# Patient Record
Sex: MALE | Race: WHITE | NOT HISPANIC OR LATINO | URBAN - METROPOLITAN AREA
[De-identification: names, ages, dates, MRNs, and addresses within clinical notes are randomized per-mention and may not be internally consistent; named-entity substitution may affect disease eponyms.]

---

## 2017-01-03 ENCOUNTER — OFFICE VISIT - HEALTHEAST (OUTPATIENT)
Dept: FAMILY MEDICINE | Facility: CLINIC | Age: 42
End: 2017-01-03

## 2017-01-03 DIAGNOSIS — M54.10 RADICULAR PAIN OF RIGHT LOWER EXTREMITY: ICD-10-CM

## 2017-01-03 DIAGNOSIS — M54.50 LOWER BACK PAIN: ICD-10-CM

## 2017-01-10 ENCOUNTER — HOSPITAL ENCOUNTER (OUTPATIENT)
Dept: PHYSICAL MEDICINE AND REHAB | Facility: CLINIC | Age: 42
Discharge: HOME | End: 2017-01-10
Attending: FAMILY MEDICINE

## 2017-01-10 DIAGNOSIS — M48.061 FORAMINAL STENOSIS OF LUMBAR REGION: ICD-10-CM

## 2017-01-10 DIAGNOSIS — G89.29 CHRONIC MIDLINE LOW BACK PAIN WITHOUT SCIATICA: ICD-10-CM

## 2017-01-10 DIAGNOSIS — M51.369 DEGENERATIVE DISC DISEASE, LUMBAR: ICD-10-CM

## 2017-01-10 DIAGNOSIS — M54.50 CHRONIC MIDLINE LOW BACK PAIN WITHOUT SCIATICA: ICD-10-CM

## 2017-01-10 DIAGNOSIS — M47.816 LUMBAR FACET ARTHROPATHY: ICD-10-CM

## 2017-01-10 DIAGNOSIS — M54.50 RIGHT LOW BACK PAIN: ICD-10-CM

## 2017-01-10 ASSESSMENT — MIFFLIN-ST. JEOR: SCORE: 1890.97

## 2017-04-14 ENCOUNTER — OFFICE VISIT - HEALTHEAST (OUTPATIENT)
Dept: PHYSICAL THERAPY | Facility: CLINIC | Age: 42
End: 2017-04-14

## 2017-04-14 ENCOUNTER — AMBULATORY - HEALTHEAST (OUTPATIENT)
Dept: PHYSICAL MEDICINE AND REHAB | Facility: CLINIC | Age: 42
End: 2017-04-14

## 2017-04-14 DIAGNOSIS — G89.29 CHRONIC BILATERAL LOW BACK PAIN WITHOUT SCIATICA: ICD-10-CM

## 2017-04-14 DIAGNOSIS — M62.81 WEAKNESS OF TRUNK MUSCULATURE: ICD-10-CM

## 2017-04-14 DIAGNOSIS — M54.50 CHRONIC BILATERAL LOW BACK PAIN WITHOUT SCIATICA: ICD-10-CM

## 2017-04-17 ENCOUNTER — OFFICE VISIT - HEALTHEAST (OUTPATIENT)
Dept: PHYSICAL THERAPY | Facility: CLINIC | Age: 42
End: 2017-04-17

## 2017-04-17 DIAGNOSIS — M54.50 CHRONIC BILATERAL LOW BACK PAIN WITHOUT SCIATICA: ICD-10-CM

## 2017-04-17 DIAGNOSIS — G89.29 CHRONIC BILATERAL LOW BACK PAIN WITHOUT SCIATICA: ICD-10-CM

## 2017-04-17 DIAGNOSIS — M62.81 WEAKNESS OF TRUNK MUSCULATURE: ICD-10-CM

## 2017-04-19 ENCOUNTER — OFFICE VISIT - HEALTHEAST (OUTPATIENT)
Dept: PHYSICAL THERAPY | Facility: CLINIC | Age: 42
End: 2017-04-19

## 2017-04-19 DIAGNOSIS — M54.50 CHRONIC BILATERAL LOW BACK PAIN WITHOUT SCIATICA: ICD-10-CM

## 2017-04-19 DIAGNOSIS — M62.81 WEAKNESS OF TRUNK MUSCULATURE: ICD-10-CM

## 2017-04-19 DIAGNOSIS — G89.29 CHRONIC BILATERAL LOW BACK PAIN WITHOUT SCIATICA: ICD-10-CM

## 2017-04-24 ENCOUNTER — OFFICE VISIT - HEALTHEAST (OUTPATIENT)
Dept: PHYSICAL THERAPY | Facility: CLINIC | Age: 42
End: 2017-04-24

## 2017-04-24 DIAGNOSIS — G89.29 CHRONIC BILATERAL LOW BACK PAIN WITHOUT SCIATICA: ICD-10-CM

## 2017-04-24 DIAGNOSIS — M54.50 CHRONIC BILATERAL LOW BACK PAIN WITHOUT SCIATICA: ICD-10-CM

## 2017-04-24 DIAGNOSIS — M62.81 WEAKNESS OF TRUNK MUSCULATURE: ICD-10-CM

## 2017-04-28 ENCOUNTER — OFFICE VISIT - HEALTHEAST (OUTPATIENT)
Dept: PHYSICAL THERAPY | Facility: CLINIC | Age: 42
End: 2017-04-28

## 2017-04-28 DIAGNOSIS — M62.81 WEAKNESS OF TRUNK MUSCULATURE: ICD-10-CM

## 2017-04-28 DIAGNOSIS — M54.50 CHRONIC BILATERAL LOW BACK PAIN WITHOUT SCIATICA: ICD-10-CM

## 2017-04-28 DIAGNOSIS — G89.29 CHRONIC BILATERAL LOW BACK PAIN WITHOUT SCIATICA: ICD-10-CM

## 2017-05-01 ENCOUNTER — OFFICE VISIT - HEALTHEAST (OUTPATIENT)
Dept: PHYSICAL THERAPY | Facility: CLINIC | Age: 42
End: 2017-05-01

## 2017-05-01 DIAGNOSIS — G89.29 CHRONIC BILATERAL LOW BACK PAIN WITHOUT SCIATICA: ICD-10-CM

## 2017-05-01 DIAGNOSIS — M54.50 CHRONIC BILATERAL LOW BACK PAIN WITHOUT SCIATICA: ICD-10-CM

## 2017-05-01 DIAGNOSIS — M62.81 WEAKNESS OF TRUNK MUSCULATURE: ICD-10-CM

## 2017-05-03 ENCOUNTER — OFFICE VISIT - HEALTHEAST (OUTPATIENT)
Dept: PHYSICAL THERAPY | Facility: CLINIC | Age: 42
End: 2017-05-03

## 2017-05-03 DIAGNOSIS — M54.50 CHRONIC BILATERAL LOW BACK PAIN WITHOUT SCIATICA: ICD-10-CM

## 2017-05-03 DIAGNOSIS — M62.81 WEAKNESS OF TRUNK MUSCULATURE: ICD-10-CM

## 2017-05-03 DIAGNOSIS — G89.29 CHRONIC BILATERAL LOW BACK PAIN WITHOUT SCIATICA: ICD-10-CM

## 2017-05-08 ENCOUNTER — OFFICE VISIT - HEALTHEAST (OUTPATIENT)
Dept: PHYSICAL THERAPY | Facility: CLINIC | Age: 42
End: 2017-05-08

## 2017-05-08 DIAGNOSIS — M62.81 WEAKNESS OF TRUNK MUSCULATURE: ICD-10-CM

## 2017-05-08 DIAGNOSIS — G89.29 CHRONIC BILATERAL LOW BACK PAIN WITHOUT SCIATICA: ICD-10-CM

## 2017-05-08 DIAGNOSIS — M54.50 CHRONIC BILATERAL LOW BACK PAIN WITHOUT SCIATICA: ICD-10-CM

## 2017-05-12 ENCOUNTER — OFFICE VISIT - HEALTHEAST (OUTPATIENT)
Dept: PHYSICAL THERAPY | Facility: CLINIC | Age: 42
End: 2017-05-12

## 2017-05-12 DIAGNOSIS — M54.50 CHRONIC BILATERAL LOW BACK PAIN WITHOUT SCIATICA: ICD-10-CM

## 2017-05-12 DIAGNOSIS — M62.81 WEAKNESS OF TRUNK MUSCULATURE: ICD-10-CM

## 2017-05-12 DIAGNOSIS — G89.29 CHRONIC BILATERAL LOW BACK PAIN WITHOUT SCIATICA: ICD-10-CM

## 2017-05-17 ENCOUNTER — OFFICE VISIT - HEALTHEAST (OUTPATIENT)
Dept: PHYSICAL THERAPY | Facility: CLINIC | Age: 42
End: 2017-05-17

## 2017-05-17 DIAGNOSIS — M62.81 WEAKNESS OF TRUNK MUSCULATURE: ICD-10-CM

## 2017-05-17 DIAGNOSIS — M54.50 CHRONIC BILATERAL LOW BACK PAIN WITHOUT SCIATICA: ICD-10-CM

## 2017-05-17 DIAGNOSIS — G89.29 CHRONIC BILATERAL LOW BACK PAIN WITHOUT SCIATICA: ICD-10-CM

## 2017-05-24 ENCOUNTER — OFFICE VISIT - HEALTHEAST (OUTPATIENT)
Dept: PHYSICAL THERAPY | Facility: CLINIC | Age: 42
End: 2017-05-24

## 2017-05-24 DIAGNOSIS — M62.81 WEAKNESS OF TRUNK MUSCULATURE: ICD-10-CM

## 2017-05-24 DIAGNOSIS — M54.50 CHRONIC BILATERAL LOW BACK PAIN WITHOUT SCIATICA: ICD-10-CM

## 2017-05-24 DIAGNOSIS — G89.29 CHRONIC BILATERAL LOW BACK PAIN WITHOUT SCIATICA: ICD-10-CM

## 2017-05-31 ENCOUNTER — COMMUNICATION - HEALTHEAST (OUTPATIENT)
Dept: PHYSICAL THERAPY | Facility: CLINIC | Age: 42
End: 2017-05-31

## 2017-06-05 ENCOUNTER — OFFICE VISIT - HEALTHEAST (OUTPATIENT)
Dept: FAMILY MEDICINE | Facility: CLINIC | Age: 42
End: 2017-06-05

## 2017-06-05 DIAGNOSIS — R07.0 THROAT PAIN: ICD-10-CM

## 2017-06-05 DIAGNOSIS — J02.0 STREP THROAT: ICD-10-CM

## 2017-06-09 ENCOUNTER — AMBULATORY - HEALTHEAST (OUTPATIENT)
Dept: PHYSICAL MEDICINE AND REHAB | Facility: CLINIC | Age: 42
End: 2017-06-09

## 2017-06-14 ENCOUNTER — OFFICE VISIT - HEALTHEAST (OUTPATIENT)
Dept: PHYSICAL THERAPY | Facility: CLINIC | Age: 42
End: 2017-06-14

## 2017-06-14 DIAGNOSIS — M62.81 WEAKNESS OF TRUNK MUSCULATURE: ICD-10-CM

## 2017-06-14 DIAGNOSIS — G89.29 CHRONIC BILATERAL LOW BACK PAIN WITHOUT SCIATICA: ICD-10-CM

## 2017-06-14 DIAGNOSIS — M54.50 CHRONIC BILATERAL LOW BACK PAIN WITHOUT SCIATICA: ICD-10-CM

## 2017-06-20 ENCOUNTER — AMBULATORY - HEALTHEAST (OUTPATIENT)
Dept: PHYSICAL MEDICINE AND REHAB | Facility: CLINIC | Age: 42
End: 2017-06-20

## 2017-11-01 ENCOUNTER — OFFICE VISIT - HEALTHEAST (OUTPATIENT)
Dept: FAMILY MEDICINE | Facility: CLINIC | Age: 42
End: 2017-11-01

## 2017-11-01 DIAGNOSIS — M51.26 LUMBAR DISC HERNIATION: ICD-10-CM

## 2017-11-01 DIAGNOSIS — M48.061 LUMBAR FORAMINAL STENOSIS: ICD-10-CM

## 2017-11-01 DIAGNOSIS — Z92.241 S/P EPIDURAL STEROID INJECTION: ICD-10-CM

## 2017-12-03 ENCOUNTER — OFFICE VISIT - HEALTHEAST (OUTPATIENT)
Dept: FAMILY MEDICINE | Facility: CLINIC | Age: 42
End: 2017-12-03

## 2017-12-03 DIAGNOSIS — J20.9 ACUTE BRONCHITIS: ICD-10-CM

## 2017-12-12 ENCOUNTER — OFFICE VISIT - HEALTHEAST (OUTPATIENT)
Dept: FAMILY MEDICINE | Facility: CLINIC | Age: 42
End: 2017-12-12

## 2017-12-12 ENCOUNTER — RECORDS - HEALTHEAST (OUTPATIENT)
Dept: GENERAL RADIOLOGY | Facility: CLINIC | Age: 42
End: 2017-12-12

## 2017-12-12 DIAGNOSIS — J20.9 ACUTE BRONCHITIS, UNSPECIFIED ORGANISM: ICD-10-CM

## 2017-12-12 DIAGNOSIS — R05.9 COUGH: ICD-10-CM

## 2017-12-12 ASSESSMENT — MIFFLIN-ST. JEOR: SCORE: 1906.85

## 2017-12-18 ENCOUNTER — COMMUNICATION - HEALTHEAST (OUTPATIENT)
Dept: SCHEDULING | Facility: CLINIC | Age: 42
End: 2017-12-18

## 2017-12-19 ENCOUNTER — OFFICE VISIT - HEALTHEAST (OUTPATIENT)
Dept: FAMILY MEDICINE | Facility: CLINIC | Age: 42
End: 2017-12-19

## 2017-12-19 DIAGNOSIS — R55 SYNCOPAL EPISODES: ICD-10-CM

## 2017-12-19 DIAGNOSIS — R05.9 COUGH: ICD-10-CM

## 2019-12-04 ENCOUNTER — OFFICE VISIT - HEALTHEAST (OUTPATIENT)
Dept: FAMILY MEDICINE | Facility: CLINIC | Age: 44
End: 2019-12-04

## 2019-12-04 DIAGNOSIS — J32.0 MAXILLARY SINUSITIS, UNSPECIFIED CHRONICITY: ICD-10-CM

## 2019-12-04 ASSESSMENT — MIFFLIN-ST. JEOR: SCORE: 1845.15

## 2020-01-30 ENCOUNTER — OFFICE VISIT - HEALTHEAST (OUTPATIENT)
Dept: FAMILY MEDICINE | Facility: CLINIC | Age: 45
End: 2020-01-30

## 2020-01-30 DIAGNOSIS — B35.1 ONYCHOMYCOSIS: ICD-10-CM

## 2020-01-30 DIAGNOSIS — R06.81 APNEIC EPISODE: ICD-10-CM

## 2020-01-30 DIAGNOSIS — E66.811 CLASS 1 OBESITY DUE TO EXCESS CALORIES WITHOUT SERIOUS COMORBIDITY WITH BODY MASS INDEX (BMI) OF 31.0 TO 31.9 IN ADULT: ICD-10-CM

## 2020-01-30 DIAGNOSIS — R73.01 IMPAIRED FASTING GLUCOSE: ICD-10-CM

## 2020-01-30 DIAGNOSIS — Z51.81 ENCOUNTER FOR THERAPEUTIC DRUG MONITORING: ICD-10-CM

## 2020-01-30 DIAGNOSIS — E66.09 CLASS 1 OBESITY DUE TO EXCESS CALORIES WITHOUT SERIOUS COMORBIDITY WITH BODY MASS INDEX (BMI) OF 31.0 TO 31.9 IN ADULT: ICD-10-CM

## 2020-01-30 DIAGNOSIS — R06.83 SNORING: ICD-10-CM

## 2020-01-30 LAB
ALBUMIN SERPL-MCNC: 4.5 G/DL (ref 3.5–5)
ALP SERPL-CCNC: 51 U/L (ref 45–120)
ALT SERPL W P-5'-P-CCNC: 14 U/L (ref 0–45)
AST SERPL W P-5'-P-CCNC: 18 U/L (ref 0–40)
BILIRUB DIRECT SERPL-MCNC: 0.2 MG/DL
BILIRUB SERPL-MCNC: 0.8 MG/DL (ref 0–1)
HBA1C MFR BLD: 5.1 % (ref 3.5–6)
PROT SERPL-MCNC: 7.1 G/DL (ref 6–8)

## 2020-03-03 ENCOUNTER — COMMUNICATION - HEALTHEAST (OUTPATIENT)
Dept: LAB | Facility: CLINIC | Age: 45
End: 2020-03-03

## 2020-03-03 DIAGNOSIS — Z51.81 MEDICATION MONITORING ENCOUNTER: ICD-10-CM

## 2020-03-13 ENCOUNTER — AMBULATORY - HEALTHEAST (OUTPATIENT)
Dept: LAB | Facility: CLINIC | Age: 45
End: 2020-03-13

## 2020-03-13 DIAGNOSIS — Z51.81 MEDICATION MONITORING ENCOUNTER: ICD-10-CM

## 2020-03-13 LAB
ALBUMIN SERPL-MCNC: 4.4 G/DL (ref 3.5–5)
ALP SERPL-CCNC: 50 U/L (ref 45–120)
ALT SERPL W P-5'-P-CCNC: 15 U/L (ref 0–45)
AST SERPL W P-5'-P-CCNC: 19 U/L (ref 0–40)
BILIRUB DIRECT SERPL-MCNC: 0.2 MG/DL
BILIRUB SERPL-MCNC: 0.6 MG/DL (ref 0–1)
ERYTHROCYTE [DISTWIDTH] IN BLOOD BY AUTOMATED COUNT: 12 % (ref 11–14.5)
HCT VFR BLD AUTO: 48.5 % (ref 40–54)
HGB BLD-MCNC: 16.8 G/DL (ref 14–18)
MCH RBC QN AUTO: 31.3 PG (ref 27–34)
MCHC RBC AUTO-ENTMCNC: 34.7 G/DL (ref 32–36)
MCV RBC AUTO: 90 FL (ref 80–100)
PLATELET # BLD AUTO: 248 THOU/UL (ref 140–440)
PMV BLD AUTO: 6.5 FL (ref 7–10)
PROT SERPL-MCNC: 7.3 G/DL (ref 6–8)
RBC # BLD AUTO: 5.37 MILL/UL (ref 4.4–6.2)
WBC: 6.5 THOU/UL (ref 4–11)

## 2020-07-31 ENCOUNTER — OFFICE VISIT - HEALTHEAST (OUTPATIENT)
Dept: FAMILY MEDICINE | Facility: CLINIC | Age: 45
End: 2020-07-31

## 2020-07-31 DIAGNOSIS — R06.83 SNORING: ICD-10-CM

## 2020-07-31 DIAGNOSIS — Z11.4 ENCOUNTER FOR SCREENING FOR HIV: ICD-10-CM

## 2020-07-31 DIAGNOSIS — Z00.00 ROUTINE GENERAL MEDICAL EXAMINATION AT A HEALTH CARE FACILITY: ICD-10-CM

## 2020-07-31 DIAGNOSIS — K40.90 RIGHT INGUINAL HERNIA: ICD-10-CM

## 2020-07-31 DIAGNOSIS — E66.811 CLASS 1 OBESITY DUE TO EXCESS CALORIES WITHOUT SERIOUS COMORBIDITY WITH BODY MASS INDEX (BMI) OF 31.0 TO 31.9 IN ADULT: ICD-10-CM

## 2020-07-31 DIAGNOSIS — E66.09 CLASS 1 OBESITY DUE TO EXCESS CALORIES WITHOUT SERIOUS COMORBIDITY WITH BODY MASS INDEX (BMI) OF 31.0 TO 31.9 IN ADULT: ICD-10-CM

## 2020-07-31 DIAGNOSIS — H93.13 TINNITUS, BILATERAL: ICD-10-CM

## 2020-07-31 DIAGNOSIS — R73.01 IMPAIRED FASTING GLUCOSE: ICD-10-CM

## 2020-07-31 DIAGNOSIS — N52.9 ERECTILE DYSFUNCTION, UNSPECIFIED ERECTILE DYSFUNCTION TYPE: ICD-10-CM

## 2020-07-31 DIAGNOSIS — B35.1 ONYCHOMYCOSIS: ICD-10-CM

## 2020-07-31 LAB
ANION GAP SERPL CALCULATED.3IONS-SCNC: 9 MMOL/L (ref 5–18)
BUN SERPL-MCNC: 9 MG/DL (ref 8–22)
CALCIUM SERPL-MCNC: 9.3 MG/DL (ref 8.5–10.5)
CHLORIDE BLD-SCNC: 105 MMOL/L (ref 98–107)
CHOLEST SERPL-MCNC: 187 MG/DL
CO2 SERPL-SCNC: 27 MMOL/L (ref 22–31)
CREAT SERPL-MCNC: 1.04 MG/DL (ref 0.7–1.3)
FASTING STATUS PATIENT QL REPORTED: YES
GFR SERPL CREATININE-BSD FRML MDRD: >60 ML/MIN/1.73M2
GLUCOSE BLD-MCNC: 88 MG/DL (ref 70–125)
HDLC SERPL-MCNC: 42 MG/DL
HIV 1+2 AB+HIV1 P24 AG SERPL QL IA: NEGATIVE
LDLC SERPL CALC-MCNC: 120 MG/DL
POTASSIUM BLD-SCNC: 4.3 MMOL/L (ref 3.5–5)
SODIUM SERPL-SCNC: 141 MMOL/L (ref 136–145)
TRIGL SERPL-MCNC: 123 MG/DL

## 2020-07-31 RX ORDER — SILDENAFIL 50 MG/1
50 TABLET, FILM COATED ORAL DAILY PRN
Qty: 30 TABLET | Refills: 5 | Status: SHIPPED | OUTPATIENT
Start: 2020-07-31

## 2020-07-31 ASSESSMENT — MIFFLIN-ST. JEOR: SCORE: 1879.62

## 2020-08-03 ENCOUNTER — COMMUNICATION - HEALTHEAST (OUTPATIENT)
Dept: FAMILY MEDICINE | Facility: CLINIC | Age: 45
End: 2020-08-03

## 2020-08-20 ENCOUNTER — OFFICE VISIT - HEALTHEAST (OUTPATIENT)
Dept: OTOLARYNGOLOGY | Facility: CLINIC | Age: 45
End: 2020-08-20

## 2020-08-20 ENCOUNTER — OFFICE VISIT - HEALTHEAST (OUTPATIENT)
Dept: AUDIOLOGY | Facility: CLINIC | Age: 45
End: 2020-08-20

## 2020-08-20 DIAGNOSIS — H93.13 TINNITUS OF BOTH EARS: ICD-10-CM

## 2020-08-20 DIAGNOSIS — H90.3 SENSORINEURAL HEARING LOSS (SNHL) OF BOTH EARS: ICD-10-CM

## 2020-08-24 ENCOUNTER — COMMUNICATION - HEALTHEAST (OUTPATIENT)
Dept: SLEEP MEDICINE | Facility: CLINIC | Age: 45
End: 2020-08-24

## 2021-01-15 ENCOUNTER — OFFICE VISIT - HEALTHEAST (OUTPATIENT)
Dept: FAMILY MEDICINE | Facility: CLINIC | Age: 46
End: 2021-01-15

## 2021-01-15 ENCOUNTER — AMBULATORY - HEALTHEAST (OUTPATIENT)
Dept: OTHER | Facility: CLINIC | Age: 46
End: 2021-01-15

## 2021-01-15 DIAGNOSIS — M54.16 LUMBAR RADICULOPATHY: ICD-10-CM

## 2021-01-15 DIAGNOSIS — Z23 ENCOUNTER FOR IMMUNIZATION: ICD-10-CM

## 2021-01-15 DIAGNOSIS — M54.42 CHRONIC MIDLINE LOW BACK PAIN WITH LEFT-SIDED SCIATICA: ICD-10-CM

## 2021-01-15 DIAGNOSIS — G89.29 CHRONIC MIDLINE LOW BACK PAIN WITH LEFT-SIDED SCIATICA: ICD-10-CM

## 2021-01-15 RX ORDER — GABAPENTIN 300 MG/1
300 CAPSULE ORAL 3 TIMES DAILY
Qty: 90 CAPSULE | Refills: 2 | Status: SHIPPED | OUTPATIENT
Start: 2021-01-15

## 2021-01-15 RX ORDER — IBUPROFEN 200 MG
200 TABLET ORAL EVERY 6 HOURS PRN
Status: SHIPPED | COMMUNITY
Start: 2021-01-15

## 2021-01-15 RX ORDER — ACETAMINOPHEN 500 MG
500 TABLET ORAL EVERY 6 HOURS PRN
Status: SHIPPED | COMMUNITY
Start: 2021-01-15

## 2021-05-26 ENCOUNTER — RECORDS - HEALTHEAST (OUTPATIENT)
Dept: ADMINISTRATIVE | Facility: CLINIC | Age: 46
End: 2021-05-26

## 2021-05-28 ENCOUNTER — RECORDS - HEALTHEAST (OUTPATIENT)
Dept: ADMINISTRATIVE | Facility: CLINIC | Age: 46
End: 2021-05-28

## 2021-05-30 VITALS — WEIGHT: 225 LBS | BODY MASS INDEX: 33.33 KG/M2 | HEIGHT: 69 IN

## 2021-05-30 VITALS — WEIGHT: 229 LBS

## 2021-05-31 VITALS — BODY MASS INDEX: 34.36 KG/M2 | WEIGHT: 232.7 LBS

## 2021-05-31 VITALS — BODY MASS INDEX: 33.23 KG/M2 | WEIGHT: 225 LBS

## 2021-05-31 VITALS — WEIGHT: 228.5 LBS | HEIGHT: 69 IN | BODY MASS INDEX: 33.84 KG/M2

## 2021-05-31 VITALS — BODY MASS INDEX: 33.37 KG/M2 | WEIGHT: 226 LBS

## 2021-05-31 VITALS — BODY MASS INDEX: 32.64 KG/M2 | WEIGHT: 221 LBS

## 2021-06-02 ENCOUNTER — RECORDS - HEALTHEAST (OUTPATIENT)
Dept: ADMINISTRATIVE | Facility: CLINIC | Age: 46
End: 2021-06-02

## 2021-06-04 VITALS
DIASTOLIC BLOOD PRESSURE: 76 MMHG | HEART RATE: 61 BPM | BODY MASS INDEX: 31.51 KG/M2 | OXYGEN SATURATION: 97 % | SYSTOLIC BLOOD PRESSURE: 118 MMHG | HEIGHT: 70 IN | WEIGHT: 220.1 LBS

## 2021-06-04 VITALS
HEART RATE: 71 BPM | BODY MASS INDEX: 31.28 KG/M2 | SYSTOLIC BLOOD PRESSURE: 110 MMHG | OXYGEN SATURATION: 97 % | DIASTOLIC BLOOD PRESSURE: 76 MMHG | WEIGHT: 218 LBS

## 2021-06-04 VITALS
OXYGEN SATURATION: 98 % | SYSTOLIC BLOOD PRESSURE: 116 MMHG | HEART RATE: 72 BPM | RESPIRATION RATE: 16 BRPM | WEIGHT: 211.4 LBS | TEMPERATURE: 99.4 F | HEIGHT: 70 IN | DIASTOLIC BLOOD PRESSURE: 64 MMHG | BODY MASS INDEX: 30.26 KG/M2

## 2021-06-05 VITALS
DIASTOLIC BLOOD PRESSURE: 90 MMHG | OXYGEN SATURATION: 98 % | BODY MASS INDEX: 32.87 KG/M2 | TEMPERATURE: 98.9 F | WEIGHT: 227 LBS | SYSTOLIC BLOOD PRESSURE: 120 MMHG | HEART RATE: 75 BPM

## 2021-06-05 NOTE — PROGRESS NOTES
Assessment/Plan:    1. Onychomycosis  Onychomycosis bilateral feet involving great toe as well as fourth and fifth toes of both feet.  Terbinafine 250 mg daily x12 weeks.  Prior CBC normal.  LFTs today to establish baseline and then anticipate lab draw in 6 weeks to check a CBC and LFTs for med monitoring.  Reassess at physical exam in this office no later than 6 months.  - terbinafine HCl (LAMISIL) 250 mg tablet; Take 1 tablet (250 mg total) by mouth daily.  Dispense: 84 tablet; Refill: 0    2. Encounter for therapeutic drug monitoring  Med monitoring completed as noted.  - Hepatic Profile    3. Snoring  Snoring with history of witnessed apneic episodes.  Sleep study to be completed.  - Ambulatory referral to Sleep Medicine    4. Apneic episode  As above.  - Ambulatory referral to Sleep Medicine    5. Class 1 obesity due to excess calories without serious comorbidity with body mass index (BMI) of 31.0 to 31.9 in adult  Dietary and exercise modification for weight goal less than 210 pounds initially, less than 200 pounds ideally.    6. Impaired fasting glucose  Impaired fasting glucose history with prior fasting blood sugar 110 through [facility] healthcare.  A1c obtained today, nonfasting.  - Glycosylated Hemoglobin A1c      The following high BMI interventions were performed this visit: encouragement to exercise, weight monitoring, weight loss from baseline weight and lifestyle education regarding diet .  Ensure ongoing efforts to achieve weight goal < 210 pounds initially, < 200 pounds ideally.         Subjective:    Flaquito Murguia is seen today for concerns with toenail fungus.  Retired from the .  Bilateral foot involvement involving great toe plus fourth and fifth toes bilateral feet.  Ongoing issue.  Does snore.  Prior episodes of witnessed apnea described.  Obesity.  Had lost weight down to 195 pounds due to stress of going through separation and upcoming divorce however has regained weight.   "Impaired fasting glucose noted with blood sugar 110 at time of labs 2019.  Total cholesterol 161, triglycerides 76, HDL 54 and  at that time.  Past medical social and family history reviewed and updated as noted below.     - \"Hoda\" x    2 sons - Alexi born 10 and Ramirez ()   1 daughter - Johanna 98    - [employer]   Mom -  62 [employer], \"enlarged heart\" with h/o rheumatic fever   Dad -   1 older brother -   1 younger sister -   Surgeries: left hernia surgery age 1  HX of pneumonia as a child   Tobacco - smokeless tobacco 2 tin lasts 1 week   EtOH - 1 or 2 per week    History reviewed. No pertinent surgical history.     History reviewed. No pertinent family history.     History reviewed. No pertinent past medical history.     Social History     Tobacco Use     Smoking status: Never Smoker     Smokeless tobacco: Current User     Types: Chew   Substance Use Topics     Alcohol use: No     Drug use: No        Current Outpatient Medications   Medication Sig Dispense Refill     terbinafine HCl (LAMISIL) 250 mg tablet Take 1 tablet (250 mg total) by mouth daily. 84 tablet 0     No current facility-administered medications for this visit.           Objective:    Vitals:    20 1320   BP: 110/76   Pulse: 71   SpO2: 97%   Weight: 218 lb (98.9 kg)      Body mass index is 31.28 kg/m .    Alert.  No apparent distress.  Onychomycosis changes involving great toe as well as fourth and fifth toes bilateral feet.  No other skin rash.  Oropharynx with mildly narrowed oropharynx only otherwise neck supple.  Chest clear.  Cardiac exam regular.      This note has been dictated using voice recognition software and as a result may contain minor grammatical errors and unintended word substitutions.   "

## 2021-06-06 NOTE — TELEPHONE ENCOUNTER
Patient has a lab only appointment next week.  Looks like its labs for Terbinafine.  Please place orders.  Thank you!

## 2021-06-08 NOTE — PROGRESS NOTES
New patient evaluation  --C/O right low back pain, ongoing  --Rates pain 4/10  --No hx of back surgery  --PT in the past, nothing recent  --Hx of MRI lumbar spine and injection in 2011, place unknown    Medication  --Not taking any pain meds

## 2021-06-08 NOTE — PROGRESS NOTES
ASSESSMENT: Flaquito Murguia is a 41 y.o. male who presents for consultation at the request of HE PCP Dane Alfonso MD, with a past medical history significant for insomnia, headache who presents today for new patient evaluation of midline low back pain L4-5 and L5-S1 region with right-sided low back pain that is chronic and ongoing that typically worsens with  fitness testing that's done every 6 months.  Otherwise typically his pain is tolerable.    Prior MRI June 11, 2011 showing mild right-sided foraminal stenosis at L5-S1 as well as minimal annular bulge L3-L5 and mild right foraminal narrowing at L4-5 with facet joint hypertrophy L3-S1 bilateral.    He does have increased pain with facet loading indicating that the majority of his pain is likely facet mediated, however right-sided foraminal stenosis could be playing a role in his right-sided low back pain. He denies any radicular pain however, is neurologically intact on exam.  Continue conservative management at this time.    OFELIA: 14%    WHO-5: 22    PHQ-2: 0    Diagnoses and all orders for this visit:    Chronic midline low back pain without sciatica  -     Ambulatory referral to PT/OT    Right low back pain  -     Ambulatory referral to PT/OT    Lumbar facet arthropathy  -     Ambulatory referral to PT/OT    Foraminal stenosis of lumbar region  -     Ambulatory referral to PT/OT    Degenerative disc disease, lumbar  -     Ambulatory referral to PT/OT     PLAN:  Reviewed spine anatomy and disease process. Discussed diagnosis and treatment options with the patient today. A shared decision making model was used.  The patient's values and choices were respected. The following represents what was discussed and decided upon by the provider and the patient.      -DIAGNOSTIC TESTS:  Reviewed lumbar spine MRI from 2011.  As patient is neurologically intact at this time no need for further imaging however if pain worsens at any time we would recommend  updated lumbar MRI.    -PHYSICAL THERAPY:  Referral to physical therapy spine Center for lumbar MedX protocol.  Discussed the importance of core strengthening, ROM, stretching exercises with the patient and how each of these entities is important in decreasing pain.  Explained to the patient that the purpose of physical therapy is to teach the patient a home exercise program.  These exercises need to be performed every day in order to decrease pain and prevent future occurrences of pain.        -MEDICATIONS:  Advised patient to continue ibuprofen 800 mg 1 tablet 3 times a day as needed for pain.  No further medications prescribed today.    Discussed side effects of medications and proper use. Patient verbalized understanding.    -INTERVENTIONS:  Patient is not interested in injections at this time.    -PATIENT EDUCATION:  45 minutes of total visit time was spent face to face with the patient today, 60% of the visit was spent on counseling, education, and coordinating care.     -FOLLOW-UP:   Follow-up in 3 months, sooner if pain is worsening or new pain arises.    Advised pt to call the Spine Center if symptoms worsen or you have problems controlling bladder and bowel function.   ______________________________________________________________________    SUBJECTIVE:  HPI:  Flaquito Murguia  Is a 41 y.o. male who presents today for new patient evaluation of low back pain midline L4-5 and L5-S1 region with pain off to the right that is currently a 4/10 that is chronic in nature.  He reports that with fitness testing every 6 months for about 3 weeks his pain typically flares up at a 6-7/10 during that time.  He reports that sit-ups as well as running typically makes his pain more bothersome however other activities bending, lifting and pushups he tolerates.  He does report that sitting for too long or standing for too long makes his pain worse to however he is able to manage that without difficulty.  He denies any  radicular pain, denies numbness or tingling, denies weakness, denies bowel or bladder dysfunction, denies balance changes.    Treatment to Date: Physical therapy in 2011 with relief.  Lumbar spine MRI and epidural steroid injection 2011 with relief for 2 months however not interested in further injections at this time.    Medications: Ibuprofen 800 mg, 1 tablet 3 times a day which does give him some benefit.    Current Outpatient Prescriptions on File Prior to Encounter   Medication Sig Dispense Refill     [DISCONTINUED] ASPIRIN/SOD BICARB/CITRIC ACID (BARRY-SELTZER ORAL) Take 1 tablet by mouth daily as needed.       [DISCONTINUED] DM/P-EPHED/ACETAMINOPH/DOXYLAM (NYQUIL D ORAL) Take by mouth. As directed at bedtime       [DISCONTINUED] methylPREDNISolone (MEDROL DOSEPACK) 4 mg tablet Take 1 tablet (4 mg total) by mouth daily. follow package directions 21 tablet 0     [DISCONTINUED] pseudoephedrine-guaifenesin (MUCINEX D)  mg per tablet Take 1 tablet by mouth every 12 (twelve) hours.       No current facility-administered medications on file prior to encounter.        No Known Allergies    No past medical history on file.     Patient Active Problem List   Diagnosis     Insomnia     Headache     Wheezing (Symptom)       No past surgical history on file.    No family history on file.    SOCIAL HX: Patient is , does work in the .  Patient smokes on a daily basis, does drink 2-3 drinks per week, denies being a heavy drinker.    ROS: Positive for cough, back pain, joint pain.  Specifically negative for bowel/bladder dysfunction, balance changes, headache, dizziness, foot drop, fevers, chills, appetite changes, nausea/vomiting, unexplained weight loss. Otherwise 13 systems reviewed are negative. Please see the patient's intake questionnaire from today for details.    OBJECTIVE:  Visit Vitals     /69 (Patient Site: Left Arm, Patient Position: Sitting)     Pulse 67     Temp 98.7  F (37.1  C) (Oral)  "    Ht 5' 9\" (1.753 m)     Wt (!) 225 lb (102.1 kg)     SpO2 95%     BMI 33.23 kg/m2       PHYSICAL EXAMINATION:    --CONSTITUTIONAL:  Vital signs as above.  No acute distress.  The patient is well nourished and well groomed.  --PSYCHIATRIC:  Appropriate mood and affect. The patient is awake, alert, oriented to person, place, time and answering questions appropriately with clear speech.    --SKIN:  Skin over the face, bilateral lower extremities, and posterior torso is clean, dry, intact without rashes.    --RESPIRATORY: Normal rhythm and effort. No abnormal accessory muscle breathing patterns noted.   --ABDOMINAL:  Soft, non-distended, non-tender throughout all quadrants.  No pulsatile mass palpated in the left lower quadrant.  --STANDING EXAMINATION:  Normal lumbar lordosis noted, no lateral shift.  --MUSCULOSKELETAL: Lumbar spine inspection reveals no evidence of deformity. Range of motion is not limited in lumbar flexion, extension, however he does have increased pain with lumbar extension and lateral rotation simultaneously bilaterally.  Very minimal tenderness to palpation bilateral L4-5 and L5-S1 facet joint region. Straight leg raising in the supine position is negative to radicular pain, however positive to back pain on the right. Sciatic notch non-tender.  --GROSS MOTOR: Gait is non-antalgic. Easily arises from a seated position. Toe walking and heel walking are normal without significant difficulty.    --LOWER EXTREMITY MOTOR TESTING:  Plantar flexion left 5/5, right 5/5   Dorsiflexion left 5/5, right 5/5   Great toe MTP extension left 5/5, right 5/5  Knee flexion left 5/5, right 5/5  Knee extension left 5/5, right 5/5   Hip flexion left 5/5, right 5/5  Hip abduction left 5/5, right 5/5  Hip adduction left 5/5, right 5/5   --HIPS: Full range of motion bilaterally. Negative FABERs on the involved lower extremity.   --NEUROLOGICAL:  2/4 patellar, medial hamstring, and achilles reflexes bilaterally.  " Sensation to light touch is intact in the bilateral L4, L5, and S1 dermatomes. Babinski is negative. No clonus.  --VASCULAR:  2/4 dorsalis pedis and posterior tibialsi pulses bilaterally.  Bilateral lower extremities are warm.  There is no pitting edema of the bilateral lower extremities.    RESULTS: Prior medical records from 1/3/2016 were reviewed today.    Imaging: MRI of the lumbar spine was reviewed today. The images were shown to the patient and the findings were explained using a spine model.    Lumbar Spine MRI 6/7/2011  Conclusion:  1.  Mild bilateral foraminal stenosis L5-S1.  2.  Mild right foraminal stenosis L4-5.  3.  Minimal annular bulge at L3-L5 levels.  4.  Facet joint hypertrophy within the lower lumbar spine.  5.  Exam otherwise negative.

## 2021-06-08 NOTE — PROGRESS NOTES
"Subjective:    Flaquito Murguia is seen today for lower back pain.  Typically aggravated after completing physical fitness test for the [employer], employer.  Required every 6 months to complete sit ups and running activity.  Typically right side.  Has had issues dating back years.  Prior MRI 2011 showing mild right-sided foraminal stenosis at L5-S1 as well as minimal annular bulge L3-L5 and mild right foraminal narrowing at L4-5 with facet joint hypertrophy with an lower lumbar spine.  Had been seen to spine care in the past and received corticosteroid injection 2011.  Symptomatic exacerbation typically lasting about 3 weeks after fitness testing.  Wondering about possible note restricting activities to cycling or something else that doesn't cause back pain aggravation.     - \"Hoda\" x    2 sons - Alexi born 10 and Ramirez ()   1 daughter - Johanna 98   [employer]   Mom -  62 [employer], \"enlarged heart\" with h/o rheumatic fever   Dad -   1 older brother -   1 younger sister -   Surgeries:  left hernia surgery age 1  HX of pneumonia as a child   Tobacco - smokeless tobacco 2 tin lasts 1 week   EtOH - 1 or 2 per week    History reviewed. No pertinent past surgical history.     History reviewed. No pertinent family history.     History reviewed. No pertinent past medical history.     Social History   Substance Use Topics     Smoking status: Never Smoker     Smokeless tobacco: Current User     Types: Chew     Alcohol use No        Current Outpatient Prescriptions   Medication Sig Dispense Refill     ASPIRIN/SOD BICARB/CITRIC ACID (BARRY-SELTZER ORAL) Take 1 tablet by mouth daily as needed.       DM/P-EPHED/ACETAMINOPH/DOXYLAM (NYQUIL D ORAL) Take by mouth. As directed at bedtime       methylPREDNISolone (MEDROL DOSEPACK) 4 mg tablet Take 1 tablet (4 mg total) by mouth daily. follow package directions 21 tablet 0     pseudoephedrine-guaifenesin (MUCINEX D)  mg per " tablet Take 1 tablet by mouth every 12 (twelve) hours.       No current facility-administered medications for this visit.           Objective:    Vitals:    01/03/17 0953   BP: 118/80   Pulse: 72   Weight: (!) 229 lb (103.9 kg)      There is no height or weight on file to calculate BMI.    Alert.  No apparent distress.  Cooperative.  Overweight.  Transfers independently however somewhat slowly.  Diminished right patellar DTR more so than right Achilles DTR.  Otherwise normal left lower extremity DTRs noted on exam.  Right lower back discomfort, mild without significant muscle spasm.  No rash.      Assessment:    1. Lower back pain  Ambulatory referral to Spine Care    methylPREDNISolone (MEDROL DOSEPACK) 4 mg tablet   2. Radicular pain of right lower extremity  Ambulatory referral to Spine Care         Plan:    Discussed recurrent lower back pain with prior abnormal MRI June 11, 2011 reviewed as noted above.  Referred patient to healthy spine care for reevaluation and treatment options otherwise seemed to be associated typically with fitness testing activities through work and would recommend restrictions in order to avoid exacerbation.  Ibuprofen 800 mg 3 times daily ×3 days then as needed.  Medrol Dosepak was provided for current exacerbation and right lower extremity radiculopathy.  We'll notify with worsening or nonimprovement.

## 2021-06-10 NOTE — PROGRESS NOTES
[facility] Daily Progress     Patient Name: Flaquito Murguia  Date: 5/12/2017  Visit #: 8  PTA visit #:  -  Referral Diagnosis:   MEDX  Chronic midline low back pain without sciatica [M54.5, G89.29]  - Primary       Right low back pain [M54.5]       Lumbar facet arthropathy [M12.88]       Foraminal stenosis of lumbar region [M99.83]       Degenerative disc disease, lumbar [M51.36]       Referring provider: Kylah Garcia C*  Visit Diagnosis:     ICD-10-CM    1. Chronic bilateral low back pain without sciatica M54.5     G89.29    2. Weakness of trunk musculature M62.81          Assessment:     HEP/POC compliance is  good .     The patient was re-tested today and showed some improvements in ROM, but no great gains in strength. HE is tolerating higher levels of exercise weight, and likely is not as strong to do isometric pushing/holds due to pain. He is reporting overall improved pain and function and will benefit from continued PT.     He will continue to benefit from skilled PT to improve strength, endurance, mobility, pain, and function.    Goal Status:  Pt. will be independent with home exercise program in : 4 weeks  Pt. will report decreased intensity, frequency of : Pain;in 6 weeks;Comment  Comment:: 50%  Patient will decrease : OFELIA score;by _ points;for improved quality of function;in 6 weeks  by ___ points: 30%  Pt will: tolerate exercises required for  testing without flare of pain in 6 weeks:  Pt will: be able to participate in recreation, baskteball/volleyball without increase in pain in 6 weeks:    Plan / Patient Education:     Continue with initial plan of care.    Plan for next visit: DE on MEDX, rotary torso, review HEP, progress core strengthening, MT PRN for tight/tender lumbar musculature and possible joint mobilizations. Eventual assessment of physical testing exercises.  MEDX testing when pain flare resolves.    Subjective:     Pain Rating: 3/10    He has improved from  the softball injury. Yesterday and the day before was actually feeling pretty good. 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     Objective:     SLR 5/12/17  75/75    Lumbar ROM WNL and pain free    Exercises:  Exercise #1: Slump Sliders  Comment #1: X 10  Exercise #2: SLR Sliders  Comment #2: X 10  Exercise #3: KEYANNA  Comment #3: X 10   Exercise #4: Rotary Torso 90 seconds 58#'s  Comment #4: Started to R  Exercise #5: LTR  Comment #5: X 10   Exercise #6: Deadbug March  Comment #6: X 10  Exercise #7: Quadruped Leg Extension  Comment #7: X 10  Exercise #8: Planks  Comment #8: 3 X 20 seconds  Exercise #9: Kenyatta Pose  Comment #9: X 30 seconds  Exercise #10: Deadbug  Comment #10: 2 X 5  Exercise #11: Femoral Nerve Sliders  Comment #11: X 10    Treatment Today     TREATMENT MINUTES COMMENTS   Evaluation     Self-care/ Home management     Manual therapy 5 Prone SI/lumbar distraction 4 X 45 seconds   Neuromuscular Re-education     Therapeutic Activity     Therapeutic Exercises 25 MEDX, rotary torso, and review HEP   Gait training     Modality__________________                Total 30    Blank areas are intentional and mean the treatment did not include these items.       Jhony MAST  5/12/2017

## 2021-06-10 NOTE — PROGRESS NOTES
[facility]   [facility]-Pelvic Initial Evaluation    Patient Name: Flaquito Murguia  Date of evaluation: 4/14/2017  Referral Diagnosis: MEDX  Chronic midline low back pain without sciatica [M54.5, G89.29]  - Primary       Right low back pain [M54.5]       Lumbar facet arthropathy [M12.88]       Foraminal stenosis of lumbar region [M99.83]       Degenerative disc disease, lumbar [M51.36]       Referring provider: Kylah Garcia C*  Visit Diagnosis:     ICD-10-CM    1. Chronic bilateral low back pain without sciatica M54.5     G89.29    2. Weakness of trunk musculature M62.81        Assessment:        Flaquito Murguia is a 41 y.o. male who presents to therapy today with chief complaints of chronic bilateral midline/bilateral low back pain L>R which started in 2004 and has episodic flares. He is limited with prolonged positioning, physical activity, and physical testing for the . With examination the patient demonstrates painful lumbar and hip ROM, + LE neural tension testing, normal myotomal/dermatomal testing, with exception of possible weakness of PF on L, tenderness to palpation of lumbar paraspinals and QL, and weakness in the back per MEDX testing. The pt will likely benefit from skilled PT to improve ROM, strength, pain, and overall function.     Goals:  Pt. will be independent with home exercise program in : 4 weeks  Pt. will report decreased intensity, frequency of : Pain;in 6 weeks;Comment  Comment:: 50%  Patient will decrease : OFELIA score;by _ points;for improved quality of function;in 6 weeks  by ___ points: 30%  Pt will: tolerate exercises required for  testing without flare of pain in 6 weeks:  Pt will: be able to participate in recreation, baskteball/volleyball without increase in pain in 6 weeks:    Patient's expectations/goals are realistic.    Barriers to Learning or Achieving Goals:  Chronicity of the problem.       Plan / Patient Instructions:        Plan of Care:    Authorization / Certification Number of Visits: 12  Communication with: Referral Source  Patient Related Instruction: Nature of Condition;Self Care instruction;Treatment plan and rationale;Posture;Body mechanics;Precautions;Next steps;Expected outcome;Basis of treatment  Times per Week: 1-2  Number of Weeks: 12  Number of Visits: 12   Discharge Planning: pt will meet all PT goals or reach a plateau with PT  Precautions / Restrictions : none  Therapeutic Exercise: ROM;Stretching;Strengthening  Neuromuscular Reeducation: kinesio tape;posture;TNE;core  Manual Therapy: soft tissue mobilization;myofascial release;joint mobilization;muscle energy  Modalities: TENS;ultrasound;cold pack;hot pack;other  Modalities: PRN    Plan for next visit: DE on MEDX, rotary torso, review HEP, initiate core strengthening, MT PRN for tight/tender lumbar musculature and possible joint mobilizations. Eventual assessment of physical testing exercises.      Subjective:         Social information:   Occupation:Admin/HR    Work Status:Working full time    History of Present Illness:      Pain started about  with a basketball injury when he moved the wrong way and was unable to walk. Sit ups/push ups over the years as well as work in the  would increase his pain. The last couple of PT tests gives him a lot of pain. This will last for 2-3 weeks. Just started a chiropractor with electrotherapy.  Pain described as constant dull pain 3-4/10. The bottoms of his feet are tingling both sides.   Worse with get in and out car, stairs, sitting too long, standing too long, playing volleyball, grooming/dressing, PT testing.  Better with nothing specific.    Previous treatment PT, chiropractic, cortisone injections    Pain Ratin  Pain rating at best: 3  Pain rating at worst: 9  Pain description: aching, dull, pain and sharp    Functional limitations are described as occurring with:   Getting in and out of bed/chair/car, stairs,  sit-ups, running, dressing, prolonged sitting/standing/walking.       Objective:      Note: Items left blank indicates the item was not performed or not indicated at the time of the evaluation.    Patient Outcome Measures :    Modified Oswestry Low Back Pain Disablity Questionnaire  in %: 34   Scores range from 0-100%, where a score of 0% represents minimal pain and maximal function. The minimal clinically important difference is a score reduction of 12%.    Examination  1. Chronic bilateral low back pain without sciatica     2. Weakness of trunk musculature       Precautions/Restrictions: None  Involved side: Bilateral  Posture Observation:      General sitting posture is  normal.  General standing posture is normal.    Lumbar ROM:    Date: 4/14/17     *Indicate scale AROM AROM AROM   Lumbar Flexion To toes, pain in back     Lumbar Extension Some pain       Right Left Right Left Right Left   Lumbar Sidebending Pain on R WNL       Lumbar Rotation         Thoracic Rotation           Lower Extremity Strength:     Date: 4/14/17     LE strength/5 Right Left Right Left Right Left   Hip Flexion (L1-3) 5 5       Hip Extension (L5-S1) 5 5       Hip Abduction (L4-5) 5 5       Hip Adduction (L2-3)         Hip External Rotation         Hip Internal Rotation         Knee Extension (L3-4) 5 5       Knee Flexion 5 5       Ankle Dorsiflexion (L4-5) 5 5       Great Toe Extension (L5) 5 5       Ankle Plantar flexion (S1) 5 4       Abdominals        Sensation    WNL to lt touch sensation screen   Reflex Testing  Lumbar Dermatomes Right Left UE Reflexes Right Left   Iliac Crest and Groin (L1)   Biceps (C5-6)     Anterior Medial Thigh (L2)   Brachioradialis (C5-6)     Anterior Thigh, Medial Epicondyle Femur (L3)   Triceps (C7-8)     Lateral Thigh, Anterior Knee, Medial Leg/Malleolus (L4)   Lucretia s test     Lateral Leg, Dorsal Foot (L5)   LE Reflexes     Lateral Foot (S1)   Patellar (L3-4)     Posterior Leg (S2)   Achilles (S1-2)      Other:   Babinski Response       Palpation: Tenderness to palpation of lower lumbar paraspinals and QL bilaterally, PSIS tender bilaterally.    Lumbar Special Tests:     Lumbar Special Tests Right Left SI Tests Right  Left   Quadrant test   SI Compression     Straight leg raise 80 + 70 + SI Distraction     Crossover response - - POSH Test - -   Slump + + Sacral Thrust - -   Sit-up test  FADIR + +   Trunk extensor endurance test  Resisted Abduction - -   Prone instability test  SHRADDHA + +   Pubic shotgun  Other:       Repeated Motion Testing:  Does not centralize  Does not peripheralize    Passive Mobility - Joint Integrity:  Tender to L3,4,5 PA mobilizaitons, WNL mobility    LE Screen:  Some pain in back with hip ROM extremes.    Treatment Today     TREATMENT MINUTES COMMENTS   Evaluation 20 Low Complexity   Self-care/ Home management     Manual therapy     Neuromuscular Re-education 10 Education on nerve tension and neuromobilizations for HEP   Therapeutic Activity     Therapeutic Exercises 15 Education on POC, MEDX, and HEP   Gait training     Modality__________________                Total 45    Blank areas are intentional and mean the treatment did not include these items.     PT Evaluation Code: (Please list factors)  Patient History/Comorbidities: none  Examination: see objective  Clinical Presentation: stable  Clinical Decision Making: low    Patient History/  Comorbidities Examination  (body structures and functions, activity limitations, and/or participation restrictions) Clinical Presentation Clinical Decision Making (Complexity)   No documented Comorbidities or personal factors 1-2 Elements Stable and/or uncomplicated Low   1-2 documented comorbidities or personal factor 3 Elements Evolving clinical presentation with changing characteristics Moderate   3-4 documented comorbidities or personal factors 4 or more Unstable and unpredictable High                Jhony MAST  4/14/2017  2:01  PM

## 2021-06-10 NOTE — PROGRESS NOTES
Assessment/Plan:     1. Routine general medical examination at a health care facility  Routine healthcare maintenance.  Preventative cares reviewed.  Patient states up-to-date with immunizations through the  and will get copy of prior tetanus status etc.  Annual physical exams to continue.    2. Class 1 obesity due to excess calories without serious comorbidity with body mass index (BMI) of 31.0 to 31.9 in adult  Dietary and exercise modifications reviewed for weight goal less than 210 pounds initially, less than 200 pounds ideally.  The cascade to be obtained today as well as fasting glucose.  - Lipid Cascade    3. Onychomycosis  Onychomycosis history.  Improvement noted in fourth and fifth toenail changes bilateral feet as well as great toe nail involvement.  Did complete 12 weeks of terbinafine earlier this year with normal follow-up lab testing.    4. Snoring  Snoring history.  Had been referred to sleep medicine.  Virtual visit was set up however was never contacted by provider.  Will re-refer patient with history of apneic episode.  - Ambulatory referral to Sleep Medicine    5. Impaired fasting glucose  Prior fasting glucose 110 historically per patient November 22, 2019 with subsequent A1c of 5.1% January 30, 2020.  Check fasting glucose today with therapeutic lifestyle changes reviewed as noted above.  - Basic Metabolic Panel    6. Tinnitus, bilateral  Bilateral tinnitus left greater than right.  ENT referral.  Normal external auditory canal and tympanic membrane without middle ear effusion etc.  - Basic Metabolic Panel  - Ambulatory referral to ENT    7. Right inguinal hernia  Small right inguinal hernia, reducible asymptomatic.  Will monitor currently and consider further referral in future if symptomatic issues arise.    8. Erectile dysfunction, unspecified erectile dysfunction type  Sildenafil 50 mg use half tablet to 1 tablet daily as needed.  Prescription printed for good Rx filled through  "[facility] pharmacy at $17 for 30 tablets.  - sildenafiL (VIAGRA) 50 MG tablet; Take 1 tablet (50 mg total) by mouth daily as needed for erectile dysfunction.  Dispense: 30 tablet; Refill: 5    9. Encounter for screening for HIV  Routine HIV screen completed.  - HIV Antigen/Antibody Screening Cascade       The following high BMI interventions were performed this visit: encouragement to exercise, weight monitoring, weight loss from baseline weight and lifestyle education regarding diet.  Ensure ongoing efforts to achieve weight goal < 210 pounds initially, < 200 pounds ideally.              Subjective:      Flaquito Murguia is a 45 y.o. male who presents for an annual exam.  Concerns with \"performance\".  Difficulty to achieve adequate erection or maintain adequate erection.  Is sexually active currently and new relationship.  Snoring issues.  Apneic episode previously.  Previously referred to sleep medicine however was never contacted by provider at time of appointment.  Would like to complete referral still.  Ear ringing left more so than right without specific noise injury.  No ear drainage.  No ear pain.  Occasionally feels sensation where he loses hearing suddenly predominantly left ear than this resolves.  Prior history of impaired fasting glucose.  Was treated for onychomycosis with terbinafine 250 mg daily x12 weeks and tolerated fine.  Not aware of any inguinal hernia etc.  Denies recent illness.  No cough fever shortness of breath.  Comprehensive review of systems as above otherwise all negative.     (going through divorce) - \"Hoda\" x    2 sons - Alexi born 10 and Ramirez ()   1 daughter - Johanna 98    - [employer] (retiring 19 after > 28 years of service)  Mom -  62 [employer], \"enlarged heart\" with h/o rheumatic fever   Dad -   1 older brother -   1 younger sister -   Surgeries: left hernia surgery age 1  HX of pneumonia as a child   Tobacco - smokeless " tobacco 2 tin lasts 1 week   EtOH - 1 or 2 per week    Healthy Habits:   Regular Exercise: Yes  Healthy Diet: Yes  Dental Visits Regularly: Yes  Seat Belt: Yes   Sexually active: Yes  Colonoscopy: N/A  Lipid Profile: Yes  Glucose Screen: Yes    Immunization History   Administered Date(s) Administered     DT (pediatric) 01/01/2005     Td,adult,historic,unspecified 01/01/2005     Immunization status: stated as current, but no records available.  Vision Screening:both eyes and glasses  Hearing: FAIL (left > right tinnitus)     Current Outpatient Medications   Medication Sig Dispense Refill     sildenafiL (VIAGRA) 50 MG tablet Take 1 tablet (50 mg total) by mouth daily as needed for erectile dysfunction. 30 tablet 5     No current facility-administered medications for this visit.      History reviewed. No pertinent past medical history.  History reviewed. No pertinent surgical history.  Patient has no known allergies.  History reviewed. No pertinent family history.  Social History     Socioeconomic History     Marital status:      Spouse name: Not on file     Number of children: Not on file     Years of education: Not on file     Highest education level: Not on file   Occupational History     Not on file   Social Needs     Financial resource strain: Not on file     Food insecurity     Worry: Not on file     Inability: Not on file     Transportation needs     Medical: Not on file     Non-medical: Not on file   Tobacco Use     Smoking status: Never Smoker     Smokeless tobacco: Current User     Types: Chew   Substance and Sexual Activity     Alcohol use: No     Drug use: No     Sexual activity: Not on file   Lifestyle     Physical activity     Days per week: Not on file     Minutes per session: Not on file     Stress: Not on file   Relationships     Social connections     Talks on phone: Not on file     Gets together: Not on file     Attends [employer] service: Not on file     Active member of club or organization:  "Not on file     Attends meetings of clubs or organizations: Not on file     Relationship status: Not on file     Intimate partner violence     Fear of current or ex partner: Not on file     Emotionally abused: Not on file     Physically abused: Not on file     Forced sexual activity: Not on file   Other Topics Concern     Not on file   Social History Narrative     Not on file       Review of Systems  Comprehensive ROS: as above, otherwise all negative.           Objective:     /76 (Patient Site: Left Arm, Patient Position: Sitting, Cuff Size: Adult Large)   Pulse 61   Ht 5' 9.69\" (1.77 m)   Wt 220 lb 1.6 oz (99.8 kg)   SpO2 97%   BMI 31.87 kg/m    Body mass index is 31.87 kg/m .    Physical    General Appearance:    Alert, cooperative, no distress, appears stated age.  Obesity.   Head:    Normocephalic, without obvious abnormality, atraumatic   Eyes:    PERRL, conjunctiva/corneas clear, EOM's intact, fundi     benign, both eyes.  Glasses.        Ears:    Normal TM's and external ear canals, both ears   Nose:   Nares normal, septum midline, mucosa normal, no drainage    or sinus tenderness   Throat:   Lips, mucosa, and tongue normal; teeth and gums normal   Neck:   Supple, symmetrical, trachea midline, no adenopathy;        thyroid:  No enlargement/tenderness/nodules; no carotid    bruit or JVD   Back:     Symmetric, no curvature, ROM normal, no CVA tenderness   Lungs:     Clear to auscultation bilaterally, respirations unlabored   Chest wall:    No tenderness or deformity   Heart:    Regular rate and rhythm, S1 and S2 normal, no murmur, rub   or gallop   Abdomen:     Soft, non-tender, bowel sounds active all four quadrants,     no masses, no organomegaly.     Genitalia:    Normal male without lesion, discharge or tenderness.  Small right inguinal hernia noted.  Reducible.   Rectal:    deferred   Extremities:   Extremities normal, atraumatic, no cyanosis or edema   Pulses:   2+ and symmetric all " extremities   Skin:   Skin color, texture, turgor normal, no rashes or lesions   Lymph nodes:   Cervical, supraclavicular, and axillary nodes normal   Neurologic:   CNII-XII intact. Normal strength, sensation and reflexes       throughout                This note has been dictated using voice recognition software and as a result may contain minor grammatical errors and unintended word substitutions.

## 2021-06-10 NOTE — PROGRESS NOTES
[facility] Daily Progress     Patient Name: Flaquito Murguia  Date: 4/24/2017  Visit #: 4  PTA visit #:  -  Referral Diagnosis:   MEDX  Chronic midline low back pain without sciatica [M54.5, G89.29]  - Primary       Right low back pain [M54.5]       Lumbar facet arthropathy [M12.88]       Foraminal stenosis of lumbar region [M99.83]       Degenerative disc disease, lumbar [M51.36]       Referring provider: Kylah Garcia C*  Visit Diagnosis:     ICD-10-CM    1. Chronic bilateral low back pain without sciatica M54.5     G89.29    2. Weakness of trunk musculature M62.81          Assessment:     HEP/POC compliance is  good .     Patient with good tolerance to MEDX, which is improved with use of 1 pad for seat height. He also shows improved mobility with KEYANNA following PA mobilizations, which is consistent with pain that seems to be facet mediated. Also limitations in LTR to the R which is also consistent with rotary torso difficulty. This did improve after SL lumbar rotational mobilizations. He does show significant fatigue with core endurance with plank testing today, holding about 15-20 seconds prior to fatigue. He will continue to benefit from skilled PT to improve strength, endurance, mobility, pain, and function.    Goal Status:  Pt. will be independent with home exercise program in : 4 weeks  Pt. will report decreased intensity, frequency of : Pain;in 6 weeks;Comment  Comment:: 50%  Patient will decrease : OFELIA score;by _ points;for improved quality of function;in 6 weeks  by ___ points: 30%  Pt will: tolerate exercises required for  testing without flare of pain in 6 weeks:  Pt will: be able to participate in recreation, baskteball/volleyball without increase in pain in 6 weeks:    Plan / Patient Education:     Continue with initial plan of care.    Plan for next visit: DE on MEDX, rotary torso, review HEP, initiate core strengthening, MT PRN for tight/tender lumbar musculature and  possible joint mobilizations. Eventual assessment of physical testing exercises.     Subjective:     Pain Ratin-5/10    Right hip is sore. More sore today with not doing the exercises while he was at a conference. Did a lot of sitting. Feels tender/sore in the right hip. Has done his stretches 2X today.     Objective:     Difficulty with trunk rotation with knees to the right, also with rotary torso when going from right to left.  Improve with LTR after SL lumbar mobilizations.  Minimal to no tenderness to PA's today with improve mobility  + Trigger point to R Glut Med.    Treatment Today     TREATMENT MINUTES COMMENTS   Evaluation     Self-care/ Home management     Manual therapy 10 R SL lumbar rotational mobilizations grade III, PA mobilizations grade III to L3,4,5   Neuromuscular Re-education     Therapeutic Activity     Therapeutic Exercises 18 MEDX, rotary torso, and review HEP   Gait training     Modality__________________                Total 28    Blank areas are intentional and mean the treatment did not include these items.       Jhony MAST  2017

## 2021-06-10 NOTE — PROGRESS NOTES
[facility] Daily Progress     Patient Name: Flaquito Murguia  Date: 5/1/2017  Visit #: 6  PTA visit #:  -  Referral Diagnosis:   MEDX  Chronic midline low back pain without sciatica [M54.5, G89.29]  - Primary       Right low back pain [M54.5]       Lumbar facet arthropathy [M12.88]       Foraminal stenosis of lumbar region [M99.83]       Degenerative disc disease, lumbar [M51.36]       Referring provider: Kylah Garcia C*  Visit Diagnosis:     ICD-10-CM    1. Chronic bilateral low back pain without sciatica M54.5     G89.29    2. Weakness of trunk musculature M62.81          Assessment:     HEP/POC compliance is  good .     Patient with good tolerance to MEDX, which is improved with use of 1 pad for seat height. He also shows improved mobility with KEYANNA following PA mobilizations, which is consistent with pain that seems to be facet mediated. Also limitations in LTR to the R which is also consistent with rotary torso difficulty. He does show good pain relief with BLE distraction.. He does show significant fatigue with core endurance with plank testing, holding about 15-20 seconds prior to fatigue. He will continue to benefit from skilled PT to improve strength, endurance, mobility, pain, and function.    Goal Status:  Pt. will be independent with home exercise program in : 4 weeks  Pt. will report decreased intensity, frequency of : Pain;in 6 weeks;Comment  Comment:: 50%  Patient will decrease : OFELIA score;by _ points;for improved quality of function;in 6 weeks  by ___ points: 30%  Pt will: tolerate exercises required for  testing without flare of pain in 6 weeks:  Pt will: be able to participate in recreation, baskteball/volleyball without increase in pain in 6 weeks:    Plan / Patient Education:     Continue with initial plan of care.    Plan for next visit: DE on MEDX, rotary torso, review HEP, initiate core strengthening, MT PRN for tight/tender lumbar musculature and possible joint  mobilizations. Eventual assessment of physical testing exercises.     Subjective:     Pain Ratin/10    Feels like it is getting worse. Having trouble trying to find a comfortable in sitting or laying down. Walking is the most comfortable. Tingling in the bottom of both feet. The exercises help him to feel looser. He has not changed anything that he does functionally, does not feel limited and was able to do some yardwork Saturday.     Objective:     Decreased pain with BLE distraction.  Opening facets on R decreases pain.  Discussed laying with legs up on chair for 20 minutes to decrease pressure through lumbar spine. Pain significantly reduces to 3/10 in this position.  Decreased hip ROM on the RLE due to pain in hip.  Stopped repetitions at 20 on MEDX to avoid overworking.    Treatment Today     TREATMENT MINUTES COMMENTS   Evaluation     Self-care/ Home management     Manual therapy 5 BLE distraction.    Neuromuscular Re-education     Therapeutic Activity     Therapeutic Exercises 23 MEDX, rotary torso, and review HEP   Gait training     Modality__________________                Total 28    Blank areas are intentional and mean the treatment did not include these items.       Jhony MAST  2017

## 2021-06-10 NOTE — PROGRESS NOTES
[facility] Daily Progress     Patient Name: Flaquito Murguia  Date: 5/24/2017  Visit #: 10  PTA visit #:  -  Referral Diagnosis:   MEDX  Chronic midline low back pain without sciatica [M54.5, G89.29]  - Primary       Right low back pain [M54.5]       Lumbar facet arthropathy [M12.88]       Foraminal stenosis of lumbar region [M99.83]       Degenerative disc disease, lumbar [M51.36]       Referring provider: Kylah Garcia C*  Visit Diagnosis:     ICD-10-CM    1. Chronic bilateral low back pain without sciatica M54.5     G89.29    2. Weakness of trunk musculature M62.81          Assessment:     HEP/POC compliance is  good .     The patient was re-tested and showed some improvements in ROM, but no great gains in strength. He is tolerating higher levels of exercise weight, and likely is not as strong to do isometric pushing/holds due to pain. His hip flexor tightness may be related to pain and anterior leg pain. His pain seems to be a related to L3,4 facets with extension at these levels. He is reporting overall improved pain and function and will benefit from continued PT.    Goal Status:  Pt. will be independent with home exercise program in : 4 weeks  Pt. will report decreased intensity, frequency of : Pain;in 6 weeks;Comment  Comment:: 50%  Patient will decrease : OFELIA score;by _ points;for improved quality of function;in 6 weeks  by ___ points: 30%  Pt will: tolerate exercises required for  testing without flare of pain in 6 weeks:  Pt will: be able to participate in recreation, baskteball/volleyball without increase in pain in 6 weeks:    Plan / Patient Education:     Continue with initial plan of care.    Plan for next visit: DE on MEDX, rotary torso, review HEP, progress core strengthening, MT PRN for tight/tender lumbar musculature and possible joint mobilizations. Eventual assessment of physical testing exercises.      Progress core strengthening for HEP PRN.    Subjective:      Pain Rating: 3/10    Hip is good, feet and legs still bother him. Back is mild to moderate right now.    Objective:   Lumbar flexion to the toes.     Hip flexor tightness with direct source of pain with hip extension in side lying.    SLR 5/12/17  75/75    Exercises:  Exercise #1: Slump Sliders  Comment #1: X 10  Exercise #2: SLR Sliders  Comment #2: X 10  Exercise #3: KEYANNA  Comment #3: X 10   Exercise #4: Rotary Torso 90 seconds 62#''s  Comment #4: Started to L  Exercise #5: LTR  Comment #5: X 10   Exercise #6: Deadbug March  Comment #6: X 10  Exercise #7: Quadruped Arm/Leg Extension  Comment #7: X 10  Exercise #8: Planks  Comment #8: 3 X 20 seconds  Exercise #9: Kenyatta Pose  Comment #9: X 30 seconds  Exercise #10: Deadbug  Comment #10: 2 X 5  Exercise #11: Femoral Nerve Sliders  Comment #11: X 10  Exercise #12: Kneeling hip flexor stretch  Comment #12: x 30 seconds  Exercise #13: Slump Tensioners  Comment #13: X 10 (to be done after slump sliders)    Treatment Today     TREATMENT MINUTES COMMENTS   Evaluation     Self-care/ Home management     Manual therapy 10 Prone SI/lumbar distraction 4 X 45 seconds, right sided hip flexor/iliopsoas release with MET   Neuromuscular Re-education     Therapeutic Activity     Therapeutic Exercises 20 MEDX, rotary torso, and review HEP   Gait training     Modality__________________                Total 30    Blank areas are intentional and mean the treatment did not include these items.       Jhony MAST  5/24/2017

## 2021-06-10 NOTE — PROGRESS NOTES
[facility] Daily Progress     Patient Name: Flaquito Murguia  Date: 2017  Visit #: 2  PTA visit #:  -  Referral Diagnosis:   MEDX  Chronic midline low back pain without sciatica [M54.5, G89.29]  - Primary       Right low back pain [M54.5]       Lumbar facet arthropathy [M12.88]       Foraminal stenosis of lumbar region [M99.83]       Degenerative disc disease, lumbar [M51.36]       Referring provider: Kylah Garcia C*  Visit Diagnosis:     ICD-10-CM    1. Chronic bilateral low back pain without sciatica M54.5     G89.29    2. Weakness of trunk musculature M62.81          Assessment:     HEP/POC compliance is  good .     Patient with good tolerance to MEDX, which is improved with use of 1 pad for seat height. He also shows improved mobility with KEYANNA following PA mobilizations, which is consistent with pain that seems to be facet mediated.    Goal Status:  Pt. will be independent with home exercise program in : 4 weeks  Pt. will report decreased intensity, frequency of : Pain;in 6 weeks;Comment  Comment:: 50%  Patient will decrease : OFELIA score;by _ points;for improved quality of function;in 6 weeks  by ___ points: 30%  Pt will: tolerate exercises required for  testing without flare of pain in 6 weeks:  Pt will: be able to participate in recreation, baskteball/volleyball without increase in pain in 6 weeks:    Plan / Patient Education:     Continue with initial plan of care.    Plan for next visit: DE on MEDX, rotary torso, review HEP, initiate core strengthening, MT PRN for tight/tender lumbar musculature and possible joint mobilizations. Eventual assessment of physical testing exercises.     Subjective:     Pain Ratin-5/10    He did the exercises and found them helpful. He was sore after the exam with the testing, but more muscle sore.     Objective:     Patient shows good tolerance to MEDX, with use of 1 pad.   Tenderness to PA's (L3,4,5)-improves after mobilizations   KEYANNA  painfree following PA's    Treatment Today     TREATMENT MINUTES COMMENTS   Evaluation     Self-care/ Home management     Manual therapy 8 PA mobilizations grade III to L3,4,5   Neuromuscular Re-education     Therapeutic Activity     Therapeutic Exercises 20 MEDX, rotary torso, and review HEP   Gait training     Modality__________________                Total 28    Blank areas are intentional and mean the treatment did not include these items.       Jhony MAST  4/17/2017

## 2021-06-10 NOTE — PROGRESS NOTES
[facility] Daily Progress     Patient Name: Flaquito Murguia  Date: 4/28/2017  Visit #: 5  PTA visit #:  -  Referral Diagnosis:   MEDX  Chronic midline low back pain without sciatica [M54.5, G89.29]  - Primary       Right low back pain [M54.5]       Lumbar facet arthropathy [M12.88]       Foraminal stenosis of lumbar region [M99.83]       Degenerative disc disease, lumbar [M51.36]       Referring provider: Kylah Garcia C*  Visit Diagnosis:     ICD-10-CM    1. Chronic bilateral low back pain without sciatica M54.5     G89.29    2. Weakness of trunk musculature M62.81          Assessment:     HEP/POC compliance is  good .     Patient with good tolerance to MEDX, which is improved with use of 1 pad for seat height. He also shows improved mobility with KEYANNA following PA mobilizations, which is consistent with pain that seems to be facet mediated. Also limitations in LTR to the R which is also consistent with rotary torso difficulty. He does show good pain relief with BLE distraction.. He does show significant fatigue with core endurance with plank testing, holding about 15-20 seconds prior to fatigue. He will continue to benefit from skilled PT to improve strength, endurance, mobility, pain, and function.    Goal Status:  Pt. will be independent with home exercise program in : 4 weeks  Pt. will report decreased intensity, frequency of : Pain;in 6 weeks;Comment  Comment:: 50%  Patient will decrease : OFELIA score;by _ points;for improved quality of function;in 6 weeks  by ___ points: 30%  Pt will: tolerate exercises required for  testing without flare of pain in 6 weeks:  Pt will: be able to participate in recreation, baskteball/volleyball without increase in pain in 6 weeks:    Plan / Patient Education:     Continue with initial plan of care.    Plan for next visit: DE on MEDX, rotary torso, review HEP, initiate core strengthening, MT PRN for tight/tender lumbar musculature and possible joint  mobilizations. Eventual assessment of physical testing exercises.     Subjective:     Pain Ratin-5/10    Hip is feeling better, but the back is sore after the machines. He did see a chiropractor and used a traction unit and was sore after it.     Objective:     Decreased pain with BLE distraction.  Opening facets on R decreases pain.  Decreased hip ROM on the RLE due to pain in hip.  Stopped repetitions at 20 on MEDX to avoid overworking.    Treatment Today     TREATMENT MINUTES COMMENTS   Evaluation     Self-care/ Home management     Manual therapy 10 BLE distraction. R Hip lateral distraction   Neuromuscular Re-education     Therapeutic Activity     Therapeutic Exercises 21 MEDX, rotary torso, and review HEP   Gait training     Modality__________________                Total 31    Blank areas are intentional and mean the treatment did not include these items.       Jhony MAST  2017

## 2021-06-10 NOTE — PROGRESS NOTES
HPI: This patient is a 46yo M who presents for evaluation of tinnitus at the request of Dr. Alfonso. He has had some degree of tinnitus in both ears for a few years now, but recently has noticed that the left ear has become a bit more constant. Non-pulsatile in nature. Denies otalgia, otorrhea, vertigo, and other major medical issues. [employer] and played in the [employer] band throughout. He now currently is a professional jazz bassist. Has not worn musicians plugs.    Past medical history, surgical history, social history, family history, medications, and allergies have been reviewed with the patient and are documented above.    Review of Systems: a 10-system review was performed. Pertinent positives are noted in the HPI and on a separate scanned document in the chart.    PHYSICAL EXAMINATION:  GEN: no acute distress, normocephalic  EYES: extraocular movements are intact, pupils are equal and round. Sclera clear.   EARS: auricles are normally formed. The external auditory canals are clear with minimal to no cerumen. Tympanic membranes are intact bilaterally with no signs of infection, effusion, retractions, or perforations.  NOSE: anterior nares are patent.   OC/OP: clear, dentition is in good repair. The tongue and palate are fully mobile and symmetric. No masses or lesions.  NECK: soft and supple. No lymphadenopathy or masses. Airway is midline.  NEURO: CN VII and XII symmetric. alert and oriented. No spontaneous nystagmus. Gait is normal.  PULM: breathing comfortably on room air, normal chest expansion with respiration  CARDS: no cyanosis or clubbing, normal carotid pulses    AUDIOGRAM: mild, symmetric HFSNL. Type A tymps.    MEDICAL DECISION-MAKING: This patient is a 46yo M with mild sensorineural hearing loss and tinnitus. He did shoot right-handed, so the difference in tinnitus on the left side is likely due to that exposure. Discussed hearing protection, masking techniques, and musician's plugs.

## 2021-06-10 NOTE — PROGRESS NOTES
[facility] Daily Progress     Patient Name: Flaquito Murguia  Date: 5/8/2017  Visit #: 8  PTA visit #:  -  Referral Diagnosis:   MEDX  Chronic midline low back pain without sciatica [M54.5, G89.29]  - Primary       Right low back pain [M54.5]       Lumbar facet arthropathy [M12.88]       Foraminal stenosis of lumbar region [M99.83]       Degenerative disc disease, lumbar [M51.36]       Referring provider: Kylah Garcia C*  Visit Diagnosis:     ICD-10-CM    1. Chronic bilateral low back pain without sciatica M54.5     G89.29    2. Weakness of trunk musculature M62.81          Assessment:     HEP/POC compliance is  good .     Patient with good tolerance to MEDX, which is improved with use of 1 pad for seat height and with decreasing the degrees of flexion from 42 to 39. He also shows improved mobility with KEYANNA following PA mobilizations, which is consistent with pain that seems to be facet mediated. He is showing good control of TA and neutral spine, but still fatigues quickly. He does show significant fatigue with core endurance with plank testing, holding about 15-20 seconds prior to fatigue.    The patient was showing good improvements and left previous session feeling great, which lasted all day. With feeling so good he tried softball and had a flare up of pain due to the running which bothered him immediately. Held MEDX testing due to this and discussed holding off on that type of exercise until he is consistently feeling good with day to day activity.     He will continue to benefit from skilled PT to improve strength, endurance, mobility, pain, and function.    Goal Status:  Pt. will be independent with home exercise program in : 4 weeks  Pt. will report decreased intensity, frequency of : Pain;in 6 weeks;Comment  Comment:: 50%  Patient will decrease : OFELIA score;by _ points;for improved quality of function;in 6 weeks  by ___ points: 30%  Pt will: tolerate exercises required for   testing without flare of pain in 6 weeks:  Pt will: be able to participate in recreation, baskteball/volleyball without increase in pain in 6 weeks:    Plan / Patient Education:     Continue with initial plan of care.    Plan for next visit: DE on MEDX, rotary torso, review HEP, progress core strengthening, MT PRN for tight/tender lumbar musculature and possible joint mobilizations. Eventual assessment of physical testing exercises.  MEDX testing when pain flare resolves.    Subjective:     Pain Ratin/10    Felt good all Wednesday until he tried to play softball. Took off running and got immediate pain. Since then his right thigh has got some weakness, left actually more than right. Sleeping is tough now.    Objective:     + femoral nerve tension testing on the left.    Addition of femoral nerve sliders to HEP for this  Held on MEDX testing due to pain flare.    SLR 5/3/17  75/75    80/70 on IE    Exercises:  Exercise #1: Slump Sliders  Comment #1: X 10  Exercise #2: SLR Sliders  Comment #2: X 10  Exercise #3: KEYANNA  Comment #3: X 10   Exercise #4: Rotary Torso 90 seconds 58#'s  Comment #4: Started to R  Exercise #5: LTR  Comment #5: X 10   Exercise #6: Deadbug March  Comment #6: X 10  Exercise #7: Quadruped Leg Extension  Comment #7: X 10  Exercise #8: Planks  Comment #8: 3 X 20 seconds  Exercise #9: Kenyatta Pose  Comment #9: X 30 seconds  Exercise #10: Deadbug  Comment #10: 2 X 5  Exercise #11: Femoral Nerve Sliders  Comment #11: X 10    Treatment Today     TREATMENT MINUTES COMMENTS   Evaluation     Self-care/ Home management     Manual therapy 15 Prone PA mobilizations Grade III L1-5, Prone SI/lumbar distraction 4 X 45 seconds   Neuromuscular Re-education     Therapeutic Activity     Therapeutic Exercises 15 MEDX, rotary torso, and review HEP   Gait training     Modality__________________                Total 30    Blank areas are intentional and mean the treatment did not include these items.       Jhony  Sabina MAST  5/8/2017

## 2021-06-10 NOTE — PROGRESS NOTES
[facility] Daily Progress     Patient Name: Flaquito Murguia  Date: 5/17/2017  Visit #: 9  PTA visit #:  -  Referral Diagnosis:   MEDX  Chronic midline low back pain without sciatica [M54.5, G89.29]  - Primary       Right low back pain [M54.5]       Lumbar facet arthropathy [M12.88]       Foraminal stenosis of lumbar region [M99.83]       Degenerative disc disease, lumbar [M51.36]       Referring provider: Kylah Garcia C*  Visit Diagnosis:     ICD-10-CM    1. Chronic bilateral low back pain without sciatica M54.5     G89.29    2. Weakness of trunk musculature M62.81          Assessment:     HEP/POC compliance is  good .     The patient was re-tested and showed some improvements in ROM, but no great gains in strength. He is tolerating higher levels of exercise weight, and likely is not as strong to do isometric pushing/holds due to pain. His hip flexor tightness may be related to pain and anterior leg pain. He may benefit from MET for this and will try next session. He is reporting overall improved pain and function and will benefit from continued PT.     He will continue to benefit from skilled PT to improve strength, endurance, mobility, pain, and function.    Goal Status:  Pt. will be independent with home exercise program in : 4 weeks  Pt. will report decreased intensity, frequency of : Pain;in 6 weeks;Comment  Comment:: 50%  Patient will decrease : OFELIA score;by _ points;for improved quality of function;in 6 weeks  by ___ points: 30%  Pt will: tolerate exercises required for  testing without flare of pain in 6 weeks:  Pt will: be able to participate in recreation, baskteball/volleyball without increase in pain in 6 weeks:    Plan / Patient Education:     Continue with initial plan of care.    Plan for next visit: DE on MEDX, rotary torso, review HEP, progress core strengthening, MT PRN for tight/tender lumbar musculature and possible joint mobilizations. Eventual assessment of  physical testing exercises.      MET for hip flexor tightness in sidelying. Progress core strengthening for HEP.    Subjective:     Pain Rating: 3/10    The pain in the back is just mild, mostly now just the feet and the legs. Did some yard work on Saturday which went well. Bottoms of feet constant tingling and numbness and front of thighs kind of weak almost.    Objective:     Hip Flexor tightness and tenderness to iliopsoas on the left > right.  Discuss doing ball STM for bottom of feet.     SLR 5/12/17  75/75    Lumbar ROM WNL and pain free    Exercises:  Exercise #1: Slump Sliders  Comment #1: X 10  Exercise #2: SLR Sliders  Comment #2: X 10  Exercise #3: KEYANNA  Comment #3: X 10   Exercise #4: Rotary Torso 90 seconds 60's  Comment #4: Started to L  Exercise #5: LTR  Comment #5: X 10   Exercise #6: Deadbug March  Comment #6: X 10  Exercise #7: Quadruped Leg Extension  Comment #7: X 10  Exercise #8: Planks  Comment #8: 3 X 20 seconds  Exercise #9: Kenyatta Pose  Comment #9: X 30 seconds  Exercise #10: Deadbug  Comment #10: 2 X 5  Exercise #11: Femoral Nerve Sliders  Comment #11: X 10    Treatment Today     TREATMENT MINUTES COMMENTS   Evaluation     Self-care/ Home management     Manual therapy 8 Prone SI/lumbar distraction 4 X 45 seconds, right sided hip flexor/iliopsoas release   Neuromuscular Re-education     Therapeutic Activity     Therapeutic Exercises 20 MEDX, rotary torso, and review HEP   Gait training     Modality__________________                Total 28    Blank areas are intentional and mean the treatment did not include these items.       Jhony MAST  5/17/2017

## 2021-06-10 NOTE — PROGRESS NOTES
[facility] Daily Progress     Patient Name: Flaquito Murguia  Date: 2017  Visit #: 3  PTA visit #:  -  Referral Diagnosis:   MEDX  Chronic midline low back pain without sciatica [M54.5, G89.29]  - Primary       Right low back pain [M54.5]       Lumbar facet arthropathy [M12.88]       Foraminal stenosis of lumbar region [M99.83]       Degenerative disc disease, lumbar [M51.36]       Referring provider: Kylah Garcia C*  Visit Diagnosis:     ICD-10-CM    1. Chronic bilateral low back pain without sciatica M54.5     G89.29    2. Weakness of trunk musculature M62.81          Assessment:     HEP/POC compliance is  good .     Patient with good tolerance to MEDX, which is improved with use of 1 pad for seat height. He also shows improved mobility with KEYANNA following PA mobilizations, which is consistent with pain that seems to be facet mediated. He does show significant fatigue with core endurance with plank testing today, holding about 15-20 seconds prior to fatigue. He will continue to benefit from skilled PT to improve strength, endurance, mobility, pain, and function.    Goal Status:  Pt. will be independent with home exercise program in : 4 weeks  Pt. will report decreased intensity, frequency of : Pain;in 6 weeks;Comment  Comment:: 50%  Patient will decrease : OFELIA score;by _ points;for improved quality of function;in 6 weeks  by ___ points: 30%  Pt will: tolerate exercises required for  testing without flare of pain in 6 weeks:  Pt will: be able to participate in recreation, baskteball/volleyball without increase in pain in 6 weeks:    Plan / Patient Education:     Continue with initial plan of care.    Plan for next visit: DE on MEDX, rotary torso, review HEP, initiate core strengthening, MT PRN for tight/tender lumbar musculature and possible joint mobilizations. Eventual assessment of physical testing exercises.     Subjective:     Pain Ratin-5/10    Feels like the left side  is a little worse today. He does notice if he does the exercises right away in the morning he can get moving quicker.     Objective:     Patient shows good tolerance to MEDX, with use of 1 pad.   Less Tender to PA's (L3,4,5)-improves after mobilizations   Pain with planks only when starting to fatigue/lose form/shake.  KEYANNA painfree following PA's   15-20 second front plank    Treatment Today     TREATMENT MINUTES COMMENTS   Evaluation     Self-care/ Home management     Manual therapy 8 PA mobilizations grade III to L3,4,5   Neuromuscular Re-education     Therapeutic Activity     Therapeutic Exercises 20 MEDX, rotary torso, and review HEP   Gait training     Modality__________________                Total 28    Blank areas are intentional and mean the treatment did not include these items.       Jhony MAST  4/19/2017

## 2021-06-10 NOTE — PROGRESS NOTES
[facility] Daily Progress     Patient Name: Flaquito Murguia  Date: 5/3/2017  Visit #: 7  PTA visit #:  -  Referral Diagnosis:   MEDX  Chronic midline low back pain without sciatica [M54.5, G89.29]  - Primary       Right low back pain [M54.5]       Lumbar facet arthropathy [M12.88]       Foraminal stenosis of lumbar region [M99.83]       Degenerative disc disease, lumbar [M51.36]       Referring provider: Kylah Garcia C*  Visit Diagnosis:     ICD-10-CM    1. Chronic bilateral low back pain without sciatica M54.5     G89.29    2. Weakness of trunk musculature M62.81          Assessment:     HEP/POC compliance is  good .     Patient with good tolerance to MEDX, which is improved with use of 1 pad for seat height and with decreasing the degrees of flexion from 42 to 39. He also shows improved mobility with KEYANNA following PA mobilizations, which is consistent with pain that seems to be facet mediated. He is showing good control of TA and neutral spine, but still fatigues quickly. He does show significant fatigue with core endurance with plank testing, holding about 15-20 seconds prior to fatigue. He will continue to benefit from skilled PT to improve strength, endurance, mobility, pain, and function.    Goal Status:  Pt. will be independent with home exercise program in : 4 weeks  Pt. will report decreased intensity, frequency of : Pain;in 6 weeks;Comment  Comment:: 50%  Patient will decrease : OFELIA score;by _ points;for improved quality of function;in 6 weeks  by ___ points: 30%  Pt will: tolerate exercises required for  testing without flare of pain in 6 weeks:  Pt will: be able to participate in recreation, baskteball/volleyball without increase in pain in 6 weeks:    Plan / Patient Education:     Continue with initial plan of care.    Plan for next visit: DE on MEDX, rotary torso, review HEP, progress core strengthening, MT PRN for tight/tender lumbar musculature and possible joint  "mobilizations. Eventual assessment of physical testing exercises.     Subjective:     Pain Ratin/10    Not too bad today. The exercises are pain free for the back for the most part. The side to side stretch really seems to help (jose pose). Has been doing some extra time laying down with the legs up on the couch.    \"Best i've felt in months!\" reported at end of session today.    Objective:     Pain with sitting and knees bent up. When offloading with hands on mat no pain.  With long sit slump pain does not worsen.   Pain seems to be more due to poor activation of core to support spine.    Decreased ROM on MEDX to 39 degrees for increased comfort on MEDX.     SLR 5/3/17  75/75    80/70 on IE    Treatment Today     TREATMENT MINUTES COMMENTS   Evaluation     Self-care/ Home management     Manual therapy 10 Prone PA mobilizations Grade III L1-5, Prone SI/lumbar distraction 3 X 30 seconds   Neuromuscular Re-education     Therapeutic Activity     Therapeutic Exercises 21 MEDX, rotary torso, and review HEP   Gait training     Modality__________________                Total 31    Blank areas are intentional and mean the treatment did not include these items.       Jhony MAST  5/3/2017  "

## 2021-06-11 NOTE — PROGRESS NOTES
[facility] Daily Progress     Patient Name: Flaquito Murguia  Date: 6/14/2017  Visit #: 11  PTA visit #:  -  Referral Diagnosis:   MEDX  Chronic midline low back pain without sciatica [M54.5, G89.29]  - Primary       Right low back pain [M54.5]       Lumbar facet arthropathy [M12.88]       Foraminal stenosis of lumbar region [M99.83]       Degenerative disc disease, lumbar [M51.36]       Referring provider: Kylah Garcia C*  Visit Diagnosis:     ICD-10-CM    1. Chronic bilateral low back pain without sciatica M54.5     G89.29    2. Weakness of trunk musculature M62.81          Assessment:     HEP/POC compliance is  good .     The patient was re-tested and showed some improvements in ROM, but no great gains in strength. He is tolerating higher levels of exercise weight, and likely is not as strong to do isometric pushing/holds due to pain. His hip flexor tightness may be related to pain and anterior leg pain. His pain seems to be a related to L3,4 facets with extension at these levels. He is reporting overall improved pain and function and will benefit from continued PT.    Goal Status:  Pt. will be independent with home exercise program in : 4 weeks  Pt. will report decreased intensity, frequency of : Pain;in 6 weeks;Comment  Comment:: 50%  Patient will decrease : OFELIA score;by _ points;for improved quality of function;in 6 weeks  by ___ points: 30%  Pt will: tolerate exercises required for  testing without flare of pain in 6 weeks:  Pt will: be able to participate in recreation, baskteball/volleyball without increase in pain in 6 weeks:    Plan / Patient Education:     Continue with initial plan of care.    Plan for next visit: DE on MEDX, rotary torso, review HEP, progress core strengthening, MT PRN for tight/tender lumbar musculature and possible joint mobilizations. Eventual assessment of physical testing exercises.      Progress core strengthening for HEP PRN. Re-test next  session.    Subjective:     Pain Ratin-2/10    The back is not nearly as bad as it has been. The tingling and shaking in the legs is still present. Physical activity wise he is doing better with yardwork and able to play some softball, but still gets sore from this. Softball is tough with the running portion. Mornings do still bother him, but gets better after the stretching.    Objective:     Discussed ways to replicated MEDX at home with julius chair and physioball.  Progressed core strengthening with physioball exercises today working with increasing stability.    SLR 17  75/75     SLR 17  85/83    Exercises:  Exercise #1: Slump Sliders  Comment #1: X 10  Exercise #2: SLR Sliders  Comment #2: X 10  Exercise #3: KEYANNA  Comment #3: X 10   Exercise #4: Rotary Torso 90 seconds 62#''s  Comment #4: Started to R  Exercise #5: LTR  Comment #5: X 10   Exercise #6: Deadbug March  Comment #6: X 10  Exercise #7: Quadruped Arm/Leg Extension  Comment #7: X 10  Exercise #8: Planks  Comment #8: 3 X 20 seconds  Exercise #9: Kenyatta Pose  Comment #9: X 30 seconds  Exercise #10: Deadbug  Comment #10: 2 X 5  Exercise #11: Femoral Nerve Sliders  Comment #11: X 10  Exercise #12: Kneeling hip flexor stretch  Comment #12: x 30 seconds  Exercise #13: Slump Tensioners  Comment #13: X 10 (to be done after slump sliders)  Exercise #14: Julius Chair  Comment #14: X 15  Exercise #15: Physioball back extensions, bridges, and ab roll outs  Comment #15: X 10 each    Treatment Today     TREATMENT MINUTES COMMENTS   Evaluation     Self-care/ Home management     Manual therapy Not done today Prone SI/lumbar distraction 4 X 45 seconds, right sided hip flexor/iliopsoas release with MET   Neuromuscular Re-education     Therapeutic Activity     Therapeutic Exercises 30 MEDX, rotary torso, and review HEP   Gait training     Modality__________________                Total 30    Blank areas are intentional and mean the treatment did not include  these items.       Jhony MAST  6/14/2017

## 2021-06-11 NOTE — PROGRESS NOTES
Assessment:     1. Strep throat  penicillin VK (PEN VK) 500 MG tablet   2. Throat pain  Rapid Strep A Screen-Throat          Plan:     We will treat strep throat with a 10 day course of Pen-Vee K.  Advised that he is contagious for 24 hours once he starts the antibiotic.  Recommend Tylenol or ibuprofen as needed for fever or discomfort.  Follow-up if symptoms are getting worse or not improving.    Subjective:       41 y.o. male presents for evaluation of a 1 day history of sore throat, fever, and chills.  He denies a headache, abdominal pain, rashes, cough, ear pain, or facial pain.  He has had some slight nasal congestion.  He has tried some Chloraseptic throat spray and ibuprofen which is helped minimally.  It really hurts to eat or drink anything.    The following portions of the patient's history were reviewed and updated as appropriate: allergies, current medications, past family history, past medical history, past social history, past surgical history and problem list.    Review of Systems  A 12 point comprehensive review of systems was negative except as noted.     Objective:        Vitals:    06/05/17 1523   BP: 120/70   Pulse: 96   Resp: 20   Temp: (!) 100.6  F (38.1  C)   SpO2: 94%     General Appearance:    Alert, pleasant, cooperative, no distress, appears stated age   Head:    Normocephalic, without obvious abnormality, atraumatic   Eyes:    Conjunctiva/corneas clear   Ears:    Normal TM's without erythema or bulging. Riya external ear canals, both ears   Nose:   Nares normal, septum midline, mucosa normal, no drainage    or sinus tenderness   Throat:  oropharynx is notable for bilateral enlarged and erythematous tonsils without exudate seen.     Neck:    Cardiovascular:  neck is supple with some moderately tender and enlarged anterior cervical lymphadenopathy.  Regular rate and rhythm, no murmurs, rubs, or gallops.   Lungs:     Clear to auscultation bilaterally without wheezes, rales, or rhonchi,  respirations unlabored    Recent Results (from the past 24 hour(s))   Rapid Strep A Screen-Throat   Result Value Ref Range    Rapid Strep A Antigen Group A Strep detected (!) No Group A Strep detected, presumptive negative                               This note has been dictated using voice recognition software. Any grammatical or context distortions are unintentional and inherent to the software

## 2021-06-12 NOTE — PROGRESS NOTES
[facility] Discharge Summary  Patient Name: Flaquito Murguia  Date: 8/7/2017  Referral Diagnosis:   MEDX  Chronic midline low back pain without sciatica [M54.5, G89.29]  - Primary       Right low back pain [M54.5]       Lumbar facet arthropathy [M12.88]       Foraminal stenosis of lumbar region [M99.83]       Degenerative disc disease, lumbar [M51.36]       Referring provider: Kylah Garcia C*  Visit Diagnosis:   1. Chronic bilateral low back pain without sciatica     2. Weakness of trunk musculature         Goals:  Pt. will be independent with home exercise program in : 4 weeks ongoing  Pt. will report decreased intensity, frequency of : Pain;in 6 weeks;Comment  Comment:: 50% Met  Patient will decrease : OFELIA score;by _ points;for improved quality of function;in 6 weeks  by ___ points: 30% NT  Pt will: tolerate exercises required for  testing without flare of pain in 6 weeks: Not Tested- planks still tough  Pt will: be able to participate in recreation, baskteball/volleyball without increase in pain in 6 weeks: Improved, still cautious/careful    Patient was seen for 12 visits from 4/14/17 to 6/14/17 with 1 missed appointments.    The patient made good improvements towards his goals with improved function and strength. He did continue to have some pain with higher level activities, such as planks and his  testing. He was able to return to softball. The patient was independent with his HEP and did not return to PT after using his authorized  visits.    Therapy will be discontinued at this time.  The patient will need a new referral to resume.    Thank you for your referral.  Jhony MAST  8/7/2017  10:31 AM

## 2021-06-13 NOTE — PROGRESS NOTES
"Assessment:    1. Lumbar disc herniation     2. Lumbar foraminal stenosis     3. S/P epidural steroid injection           Plan:    25 minutes total time with patient, > 50% with counseling and coordination of cares.  Discussed lumbar disc herniation status post prior epidural steroid injection with lumbar foraminal stenosis previously outlined.  Lumbar MRI 2011 reviewed with mild bilateral foraminal stenosis L5-S1, mild right foraminal stenosis L4-L5 and minimal annual bulge L3-L5 with facet joint hypertrophy.  Did review and complete [employer] paperwork regarding chronic restrictions with inability to complete times a 2 mile run or timed sit ups otherwise able to participate in other activities as noted with maximum weight restriction 100 pounds.  Will continue home exercise program as developed through physical therapy with use of ibuprofen on as-needed basis with current pain described as 2 out of 10, chronic, stable.        Subjective:    Flaquito PRO DuronBlade is seen today for evaluation of chronic lower back pain.  More mid low back as well as some to the right side.  History of lumbar disc herniation previously noted on lumbar MRI 2011 as noted above.  Lumbar foraminal stenosis also present.  Epidural steroid injection was completed.  Patient able to complete most activities at this time without restriction however ×2 mile run as well as timed sit ups do cause lumbar back pain exacerbation that can last up to a month.  May complete other activities as required however ongoing attempts at avoidance of exacerbating triggers as noted.  Using ibuprofen as needed for breakthrough symptoms.  Comprehensive review of systems as above otherwise all negative.     - \"Hoda\" x    2 sons - Alexi born 11 and Ramirez ()   1 daughter - Johanna 98    - [employer]   Mom -  62 [employer], \"enlarged heart\" with h/o rheumatic fever   Dad -   1 older brother - "   1 younger sister -   Surgeries: left hernia surgery age 1  HX of pneumonia as a child   Tobacco - smokeless tobacco 2 tin lasts 1 week   EtOH - 1 or 2 per week    No past surgical history on file.     No family history on file.     No past medical history on file.     Social History   Substance Use Topics     Smoking status: Never Smoker     Smokeless tobacco: Current User     Types: Chew     Alcohol use No        No current outpatient prescriptions on file.     No current facility-administered medications for this visit.           Objective:    Vitals:    11/01/17 1432   BP: 110/80   Pulse: 80   Weight: (!) 225 lb (102.1 kg)      Body mass index is 33.23 kg/(m^2).    Alert.  No apparent distress.  Does describe lower back pain midline lumbar spine around L4-L5 primarily.  DTRs symmetric lower extremities without significant asymmetry or decrease.  Obesity noted with BMI 33.  Transfers independently.

## 2021-06-14 NOTE — PROGRESS NOTES
Assessment and Plan:     1. Acute bronchitis, unspecified organism  levoFLOXacin (LEVAQUIN) 750 MG tablet   2. Cough  XR Chest 2 Views     Patient has history of sphenoid sinusitis and feels as though his symptoms are similar.  Due to abnormal lung sounds, will treat with levofloxacin 750 mg once daily for 5 days.  Educated on its indications and side effects including possible tendon rupture.  Patient has an albuterol inhaler at home.  I encouraged him to use this frequently over the next 2 days to assist with wheezing.  If no improvement in symptoms, suggest follow-up with Dr. Alfonso.  He is content with the plan.    Subjective:     Flaquito is a 42 y.o. male presenting to the clinic for concerns for cold symptoms for 3 weeks.  Patient complains of rhinorrhea, postnasal drainage, nonproductive cough.  Patient states when he coughs he has a severe pain on the top of his head.  He is felt lightheaded like he is going to pass out.  He has had chest tightness.  He denies shortness of breath, wheezing, nausea, vomiting, stomachache.  He has been taking over-the-counter Mucinex DM, [place] with minimal relief.  He was seen in urgent care on 12/3/17 where influenza testing was negative.  No one else around him is ill.  Patient states he has a history of sphenoid sinusitis requiring surgery. He had a similar headache on the top of his head when he had sphenoid sinusitis.     Review of Systems: A complete 14 point review of systems was obtained and is negative or as stated in the history of present illness.    Social History     Social History     Marital status:      Spouse name: N/A     Number of children: N/A     Years of education: N/A     Occupational History     Not on file.     Social History Main Topics     Smoking status: Never Smoker     Smokeless tobacco: Current User     Types: Chew     Alcohol use No     Drug use: No     Sexual activity: Not on file     Other Topics Concern     Not on file     Social  "History Narrative       Active Ambulatory Problems     Diagnosis Date Noted     Insomnia      Headache      Wheezing (Symptom)      Resolved Ambulatory Problems     Diagnosis Date Noted     Acute sphenoidal sinusitis      Cough      No Additional Past Medical History       No family history on file.    Objective:     /82  Pulse 66  Temp 98.3  F (36.8  C)  Ht 5' 9\" (1.753 m)  Wt (!) 228 lb 8 oz (103.6 kg)  SpO2 96%  BMI 33.74 kg/m2    Patient is alert, in no obvious distress.   Skin: Warm, dry.  No lesions or rashes.  Skin turgor rapid return.   HEENT:  Head normocephalic, atraumatic.  Eyes normal.  Ears normal.  Nose patent, mucosa red.  Oropharynx mucosa pink.  No lesions or tonsillar enlargement.   Neck: Supple, no lymphadenopathy.  Lungs:  Expiratory wheezing and rhonchi heard in bilateral lung bases. Respirations even and unlabored.  No  rales noted.   Heart:  Regular rate and rhythm.  No murmurs.    LABORATORY: I ordered and personally reviewed chest x-rays showing no obvious infiltrate.  Will have radiology review.                "

## 2021-06-14 NOTE — PROGRESS NOTES
Assessment:    1. Cough  azithromycin (ZITHROMAX) 250 MG tablet    codeine-guaiFENesin (GUAIFENESIN AC)  mg/5 mL liquid   2. Syncopal episodes           Plan:    Discussed prolonged cough.  Symptoms began about a month ago with temps of 102.  Fevers improved however cough is persisted.  Has had syncopal episode yesterday apparently while at home during significant coughing spell.  Was seen initially through urgent care then transferred to [facility] emergency department for further evaluation.  Felt much better at the end of the visit associated with receipt of Solu-Medrol etc.  Now cough has returned.  Guaifenesin with codeine 2 teaspoons every 6 hours as needed for cough.  Z-Maximilian prescribed for atypical organisms with pertussis swab pending otherwise d-dimer, BNP, basic metabolic panel and CBC were unremarkable with the recent influenza testing negative December 3, 2017.  Chest x-ray and ER was negative.  Patient will complete to prednisone 60 mg daily ×5 days as prescribed to ER and notify persistent concerns or worsening.        Subjective:    Flaquito POR Murguia is seen today for follow-up evaluation.  Ongoing cough.  Began a month ago.  Attempts including T-max 102 noted initially that improved her cough has persisted.  Headache with coughing spells consistent with prior sphenoid sinusitis issues in the past.  Had been prescribed levofloxacin recently which he completed last Friday 750 mg daily ×5 days.  Did not significantly help.  Feels that he had Adacel booster less than 5 years ago through the .  Was given albuterol metered-dose inhaler.  Solu-Medrol in  mg IV and told use prednisone 60 mg daily ×5 days.  Denies nausea or vomiting.  No rash.  Coughing episode in which she started to have tunnel vision while driving as well as episode last evening in which he had apparently had syncopal episode while at home with his son.  Comprehensive review of systems as above otherwise all  "negative.     - \"Hoda\" x    2 sons - Alexi born 11 and Ramirez ()   1 daughter - Johanna 98    - [employer]   Mom -  62 [employer], \"enlarged heart\" with h/o rheumatic fever   Dad -   1 older brother -   1 younger sister -   Surgeries: left hernia surgery age 1  HX of pneumonia as a child   Tobacco - smokeless tobacco 2 tin lasts 1 week   EtOH - 1 or 2 per week    History reviewed. No pertinent surgical history.     History reviewed. No pertinent family history.     History reviewed. No pertinent past medical history.     Social History   Substance Use Topics     Smoking status: Never Smoker     Smokeless tobacco: Current User     Types: Chew     Alcohol use No        Current Outpatient Prescriptions   Medication Sig Dispense Refill     albuterol (PROAIR HFA;PROVENTIL HFA;VENTOLIN HFA) 90 mcg/actuation inhaler Inhale 2 puffs every 6 (six) hours as needed for wheezing. 1 each 0     predniSONE (DELTASONE) 10 mg tablet Take 6 tablets (60 mg total) by mouth daily for 5 days. 30 tablet 0     azithromycin (ZITHROMAX) 250 MG tablet Take 2 tabs on day one, and then 1 tab on days 2-5. 6 tablet 0     benzonatate (TESSALON) 200 MG capsule Take 1 capsule (200 mg total) by mouth 3 (three) times a day as needed for cough. 15 capsule 0     codeine-guaiFENesin (GUAIFENESIN AC)  mg/5 mL liquid Take 10 mL by mouth 4 (four) times a day as needed for cough. 240 mL 0     No current facility-administered medications for this visit.           Objective:    Vitals:    17 1102   BP: 120/88   Pulse: 99   Temp: 98.6  F (37  C)   SpO2: 94%   Weight: 221 lb (100.2 kg)      Body mass index is 31.71 kg/(m^2).    Alert.  No apparent distress.  Harsh cough, frequent.  HEENT exam with TMs normal.  Nasopharynx and oropharynx normal.  Neck supple.  Chest clear without inspiratory crackle or expiratory wheeze does however have scattered rhonchi.  Cardiac exam regular.  Extremities warm and dry.       "

## 2021-06-14 NOTE — PROGRESS NOTES
Assessment/Plan:    1. Chronic midline low back pain with left-sided sciatica  Chronic midline lower back pain with left lower extremity radiculopathy and bilateral foot paresthesias.  Trial gabapentin 300 mg titrating to 3 times daily as directed.  Reassess at scheduled physical exam in 6 months, sooner if persistent issues for further dose titration.  [facility] for current VA disability rating which is pending.  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  - gabapentin (NEURONTIN) 300 MG capsule; Take 1 capsule (300 mg total) by mouth 3 (three) times a day.  Dispense: 90 capsule; Refill: 2    2. Lumbar radiculopathy  As above, initiate gabapentin 300 mg titrated 3 times daily.  Notify persistent concerns.  - gabapentin (NEURONTIN) 300 MG capsule; Take 1 capsule (300 mg total) by mouth 3 (three) times a day.  Dispense: 90 capsule; Refill: 2    3. Encounter for immunization  Adacel booster and seasonal flu shot provided.  - Tdap vaccine greater than or equal to 8yo IM  - Influenza, Recombinant, Inj, Quadrivalent, PF, 18+YRS          Subjective:    Flaquito Murguia is seen today for chronic low back pain.  Dates back to 2003.  Was participating in PT at [employer] playing basketball.  Back suddenly locked up and he went face first to the ground and could not move.  Had to be seen to urgent care and received a shot which caused him to pass out.  Subsequently took several weeks for improvement however back pain is persisted ever since.  Patient recently submitted for VA disability perhaps 3 weeks ago and is waiting on VA disability rating.  Had been seen in 2007 or 2008 for injections through pain clinic described bilateral L4-L5 as well as S1 and S2 apparently.  Has gone through a couple rounds of physical therapy.  Patient subsequently  "retired from [employer].  Currently working for the [facility] with [employer] with  position.  Applying for supervisory HR position and due to chronic back pain issues was told to complete a schedule a hiring authority form to help fill position.  Patient describes chronic back pain at 3-1/2 out of 10.  Uses 1200 to 1600 mg ibuprofen plus Tylenol with back pain exacerbations.  Pain will worsen with activities including shoveling, lifting etc.  Did review MRI from 2011 showing mild bilateral foraminal stenosis at L5-S1 as well as mild right-sided foraminal narrowing at L4-L5 and minimal annular bulging at L3-L5 levels.  Facet joint hypertrophy lower spine.  Comprehensive review of systems as above otherwise all negative.       - \"[place]\" ( in )   2 sons - Alexi born 10 and Ramirez ()   1 daughter - Johanna 98    - [employer] (retiring 19 after > 28 years of service)  Mom -  62 [place], \"enlarged heart\" with h/o rheumatic fever   Dad -  70 \"prostate and rectal area\" cancer with mets to brain  1 older brother -   1 younger sister -   Surgeries: left hernia surgery age 1  HX of pneumonia as a child   Tobacco - smokeless tobacco 2 tin lasts 1 week   EtOH - 3-4 per week    No past surgical history on file.     No family history on file.     No past medical history on file.     Social History     Tobacco Use     Smoking status: Never Smoker     Smokeless tobacco: Current User     Types: Chew     Tobacco comment: chews tobacco   Substance Use Topics     Alcohol use: No     Drug use: No        Current Outpatient Medications   Medication Sig Dispense Refill     acetaminophen (TYLENOL) 500 MG tablet Take 500 mg by mouth every 6 (six) hours as needed for pain. 3 twice a day       ibuprofen (ADVIL,MOTRIN) 200 MG tablet Take 200 mg by mouth every 6 (six) hours as needed for pain. 4 tabs every 6-8 hours when having pain       " sildenafiL (VIAGRA) 50 MG tablet Take 1 tablet (50 mg total) by mouth daily as needed for erectile dysfunction. 30 tablet 5     gabapentin (NEURONTIN) 300 MG capsule Take 1 capsule (300 mg total) by mouth 3 (three) times a day. 90 capsule 2     No current facility-administered medications for this visit.           Objective:    Vitals:    01/15/21 0913   BP: 120/90   Pulse: 75   Temp: 98.9  F (37.2  C)   SpO2: 98%   Weight: (!) 227 lb (103 kg)      Body mass index is 32.87 kg/m .    Alert.  No apparent distress however does transfer somewhat slowly.  Lower back pain described midline as well as left posterior lateral buttocks and upper thigh.  DTR symmetric bilateral lower extremities currently.  No ankle edema.  No rash.  Patient is cooperative and forthcoming.      This note has been dictated using voice recognition software and as a result may contain minor grammatical errors and unintended word substitutions.

## 2021-06-14 NOTE — PROGRESS NOTES
Assessment:      Acute Bronchitis      Plan:      Explained lack of efficacy of antibiotics in viral disease.  Antitussives per medication orders.  B-agonist inhaler.  OTC analgesics   Discussed signs of worsening infection and when to follow-up with PCP if no symptom improvement.     Patient Instructions   You were seen today for acute bronchitis. This is likely due to a viral illness.    Symptom management:  - Get plenty of rest  - Avoid smoking and second hand smoke  - May take tylenol or ibuprofen for fever/discomfort  - Drink plenty of non-caffeinated fluids  - Albuterol inhaler may be used every 6 hours as needed for chest tightness  - Tessalon perles to help suppress the cough    Reasons to be seen in the emergency room:  - Develop a fever of 100.4 or higher  - Cough changes, coughing up blood, or become short of breath  - Neck stiffness  - Chest pain  - Severe headache  - Unable to tolerate eating or drinking fluids    Otherwise, if no symptom improvement after 5 days, follow-up with your primary care provider.      Subjective:       Flaquito Murguia is a 42 y.o. male here for evaluation of a cough.  The cough is non-productive with chest tightness and is aggravated by nothing. Onset of symptoms was 5 days ago, unchanged since that time.  Associated symptoms include fever of 102.5. Patient does not have a history of asthma. Patient has not had recent travel. Patient does have a history of smoking. Has used theraflu, advil, and Nyquil for treatment with minimal relief.    The following portions of the patient's history were reviewed and updated as appropriate: allergies, current medications and problem list.    Review of Systems  Pertinent items are noted in HPI.     Allergies  No Known Allergies      Objective:      General appearance: alert, appears stated age, cooperative, no distress and non-toxic  Head: Normocephalic, without obvious abnormality, atraumatic  Ears: normal TM's and external ear canals  both ears  Nose: septum midline, no discharge  Throat: Post nasal drip present, posterior oropharynx erythematous, MMM, no tonsil swelling or exudate, lips and tongue normal  Neck: no adenopathy and supple, symmetrical, trachea midline  Lungs: clear to auscultation bilaterally and no rhonchi, rales, or wheezing  Heart: regular rate and rhythm, S1, S2 normal, no murmur, click, rub or gallop     Lab Results    Recent Results (from the past 24 hour(s))   Rapid Strep A Screen-Throat   Result Value Ref Range    Rapid Strep A Antigen No Group A Strep detected, presumptive negative No Group A Strep detected, presumptive negative   Influenza A/B Rapid Test   Result Value Ref Range    Influenza  A, Rapid Antigen No Influenza A antigen detected No Influenza A antigen detected    Influenza B, Rapid Antigen No Influenza B antigen detected No Influenza B antigen detected       I personally reviewed these results and discussed findings with the patient.

## 2021-06-17 NOTE — PATIENT INSTRUCTIONS - HE
Patient Instructions by Lashanda Campbell CNP at 12/4/2019 11:40 AM     Author: Lashanda Campbell CNP Service: -- Author Type: Nurse Practitioner    Filed: 12/4/2019  1:06 PM Encounter Date: 12/4/2019 Status: Addendum    : Lashanda Campbell CNP (Nurse Practitioner)    Related Notes: Original Note by Lashanda Campbell CNP (Nurse Practitioner) filed at 12/4/2019  1:05 PM       Antibiotic - can fill if face still hurts quite a bit on Saturday or Sunday.      Recommend Neti Pot/Sinus Rinse for congestion/sinus pressure.    Productive cough = Mucinex will help get mucus out.    Dry cough = Dayquil or Nyquil.      Tea with 1 tsp of honey for cough.      Tylenol (acetaminophen) or ibuprofen as needed for discomfort if not included in cough medicine.       Patient Education     Sinusitis (No Antibiotics)    The sinuses are air-filled spaces within the bones of the face. They connect to the inside of the nose. Sinusitis is an inflammation of the tissue that lines the sinuses. Sinusitis can occur during a cold. It can also happen due to allergies to pollens and other particles in the air. It can cause symptoms such as sinus congestion, headache, sore throat, facial swelling, and a feeling of fullness. It may also cause a low-grade fever. Your sinusitis does not include an infection with bacteria. Because of this, antibiotics are not used to treat this problem.  Home care    Drink plenty of water, hot tea, and other liquids. This may help thin nasal mucus. It also may help your sinuses drain fluids.    Heat may help soothe painful areas of your face. Use a towel soaked in hot water. Or,  the shower and direct the warm spray onto your face. Using a vaporizer along with a menthol rub at night may also help soothe symptoms.     An expectorant with guaifenesin may help thin nasal mucus and help your sinuses drain fluids.    You can use an over-the-counter decongestant, unless a similar medicine was prescribed to you.  Nasal sprays work the fastest. Use one that contains phenylephrine or oxymetazoline. First blow your nose gently. Then use the spray. Do not use these medicines more often than directed on the label. If you do, your symptoms may get worse. You may also take pills that contain pseudoephedrine. Dont use products that combine multiple medicines. This is because side effects may be increased. Read all medicine labels. You can also ask the pharmacist for help. (People with high blood pressure should not use decongestants. They can raise blood pressure.)    Over-the-counter antihistamines may help if allergies contributed to your sinusitis.      Use acetaminophen or ibuprofen to control pain, unless another pain medicine was prescribed to you. If you have chronic liver or kidney disease or ever had a stomach ulcer, talk with your healthcare provider before using these medicines. (Aspirin should never be taken by anyone under age 18 who is ill with a fever. It may cause severe liver damage.)    Use nasal rinses or irrigation as instructed by your healthcare provider.    Don't smoke. This can make symptoms worse.  Follow-up care  Follow up with your healthcare provider or our staff if you are better in 1 week.  When to seek medical advice  Call your healthcare provider if any of these occur:    Green or yellow fluid draining from your nose or into your throat    Facial pain or headache that gets worse    Stiff neck    Unusual drowsiness or confusion    Swelling of your forehead or eyelids    Vision problems, such as blurred or double vision    Fever of 100.4 F (38 C) or higher, or as directed by your healthcare provider    Seizure    Breathing problems    Symptoms that don't go away in 10 days  Date Last Reviewed: 11/1/2017 2000-2017 [employer]. [address] [place] [address], [place] [address]. All rights reserved. This information is not intended as a substitute for professional medical care. Always follow your  healthcare professional's instructions.

## 2021-06-20 NOTE — LETTER
Letter by Katie Tijerina LPN at      Author: Katie Tijerina LPN Service: -- Author Type: --    Filed:  Encounter Date: 8/24/2020 Status: (Other)         Flaquito PRO Murguia  [address] [place] [id number]             August 24, 2020         Dear Mr. Murguia,    We have received a referral for you to have a consult with one of our sleep providers at [facility].  We are currently not having patients come into the clinic but are happy to off you a video or telephone consult if you are interested in that option.  The consult consists of talking with the provider about your sleep concerns and than deciding what the best option of care will be from there.  If this is something you would like to get scheduled, please call us directly at [phone] and we will get you scheduled as soon as possible.        Thank you,        Electronically signed by Katie Tijerina LPN

## 2021-06-21 NOTE — LETTER
Letter by Dane Alfonso MD at      Author: Dane Alfonso MD Service: -- Author Type: --    Filed:  Encounter Date: 1/15/2021 Status: (Other)         January 15, 2021     Patient: Flaquito Murguia   YOB: 1975   Date of Visit: 1/15/2021       To Whom it May Concern:    This letter serves as certification that Flaquito Murguia is an individual with an intellectual disability, severe physical disability or psychiatric disability, and can be considered for employment under the [employer] hiring authority 5 ,3102(u).  Thank you for your interest in considering this individual for employment.  You may contact me at [phone].    If you have any questions or concerns, please don't hesitate to call.    Sincerely,         Electronically signed by Dane Alfonso MD

## 2021-06-27 ENCOUNTER — HEALTH MAINTENANCE LETTER (OUTPATIENT)
Age: 46
End: 2021-06-27

## 2021-06-28 NOTE — PROGRESS NOTES
Progress Notes by Lashanda Campbell CNP at 12/4/2019 11:40 AM     Author: Lashanda Campbell CNP Service: -- Author Type: Nurse Practitioner    Filed: 12/12/2019  8:33 AM Encounter Date: 12/4/2019 Status: Signed    : Lashanda Campbell CNP (Nurse Practitioner)       Chief Complaint   Patient presents with   ? Nasal Congestion   ? Headache       ASSESSMENT & PLAN:   Diagnoses and all orders for this visit:    Maxillary sinusitis, unspecified chronicity  -     amoxicillin-clavulanate (AUGMENTIN) 875-125 mg per tablet; Take 1 tablet by mouth 2 (two) times a day for 7 days.  Dispense: 14 tablet; Refill: 0        MDM:    Sinus congestion with symptoms for 7 days.  Given paper prescription for Augmentin to fill if still present.  Discussed risks with unnecessary antibiotic use and that most sinusitis is viral in nature.  Patient does not have severe symptoms such as fever, facial swelling that would require antibiotic immediately.    Supportive care discussed.  See discharge instructions below for specific recommendations given.    At the end of the encounter, I discussed results, diagnosis, medications. Discussed red flags for immediate return to clinic/ER, as well as indications for follow up if no improvement. Patient and/or caregiver understood and agreed to plan. Patient was stable for discharge.    SUBJECTIVE    HPI:  HPI  Flaquito Murguia presents to the walk-in clinic with   Chief Complaint   Patient presents with   ? Nasal Congestion   ? Headache     ST starting 1 week ago, improved, now congested and coughing x 1 week.  Clear nasal drainage.      Associated with: headache.  Chills/sweats, 2 nights ago.      Denies: Fevers, shortness of breath    Known exposures: none     See ROS for additional symptoms and/or pertinent negatives.       History obtained from the patient.    No past medical history on file.    Active Ambulatory (Non-Hospital) Problems    Diagnosis   ? Wheezing (Symptom)   ? Insomnia   ?  "Headache       No family history on file.    Social History     Tobacco Use   ? Smoking status: Never Smoker   ? Smokeless tobacco: Current User     Types: Chew   Substance Use Topics   ? Alcohol use: No       Review of Systems   Eyes: Positive for pain (from congestion per pt ).   Respiratory: Negative for shortness of breath.        OBJECTIVE    Vitals:    12/04/19 1233   BP: 116/64   Patient Site: Right Arm   Patient Position: Sitting   Cuff Size: Adult Regular   Pulse: 72   Resp: 16   Temp: 99.4  F (37.4  C)   TempSrc: Oral   SpO2: 98%   Weight: 211 lb 6.4 oz (95.9 kg)   Height: 5' 10\" (1.778 m)       Physical Exam  Constitutional:       General: He is not in acute distress.     Appearance: He is well-developed.   HENT:      Right Ear: Tympanic membrane and external ear normal.      Left Ear: Tympanic membrane and external ear normal.      Nose: Congestion and rhinorrhea present.      Right Sinus: Maxillary sinus tenderness and frontal sinus tenderness present.      Left Sinus: Maxillary sinus tenderness and frontal sinus tenderness present.      Mouth/Throat:      Pharynx: Posterior oropharyngeal erythema present.   Eyes:      General:         Right eye: No discharge.         Left eye: No discharge.      Conjunctiva/sclera: Conjunctivae normal.   Pulmonary:      Effort: Pulmonary effort is normal.   Musculoskeletal: Normal range of motion.   Lymphadenopathy:      Cervical: No cervical adenopathy.   Skin:     General: Skin is warm and dry.      Capillary Refill: Capillary refill takes less than 2 seconds.   Neurological:      Mental Status: He is alert and oriented to person, place, and time.   Psychiatric:         Mood and Affect: Mood normal.         Behavior: Behavior normal.         Thought Content: Thought content normal.         Judgment: Judgment normal.         Labs:  No results found for this or any previous visit (from the past 240 hour(s)).      Radiology:    No results found.    PATIENT INSTRUCTIONS: "   Patient Instructions   Antibiotic - can fill if face still hurts quite a bit on Saturday or Sunday.      Recommend Neti Pot/Sinus Rinse for congestion/sinus pressure.    Productive cough = Mucinex will help get mucus out.    Dry cough = Dayquil or Nyquil.      Tea with 1 tsp of honey for cough.      Tylenol (acetaminophen) or ibuprofen as needed for discomfort if not included in cough medicine.       Patient Education     Sinusitis (No Antibiotics)    The sinuses are air-filled spaces within the bones of the face. They connect to the inside of the nose. Sinusitis is an inflammation of the tissue that lines the sinuses. Sinusitis can occur during a cold. It can also happen due to allergies to pollens and other particles in the air. It can cause symptoms such as sinus congestion, headache, sore throat, facial swelling, and a feeling of fullness. It may also cause a low-grade fever. Your sinusitis does not include an infection with bacteria. Because of this, antibiotics are not used to treat this problem.  Home care    Drink plenty of water, hot tea, and other liquids. This may help thin nasal mucus. It also may help your sinuses drain fluids.    Heat may help soothe painful areas of your face. Use a towel soaked in hot water. Or,  the shower and direct the warm spray onto your face. Using a vaporizer along with a menthol rub at night may also help soothe symptoms.     An expectorant with guaifenesin may help thin nasal mucus and help your sinuses drain fluids.    You can use an over-the-counter decongestant, unless a similar medicine was prescribed to you. Nasal sprays work the fastest. Use one that contains phenylephrine or oxymetazoline. First blow your nose gently. Then use the spray. Do not use these medicines more often than directed on the label. If you do, your symptoms may get worse. You may also take pills that contain pseudoephedrine. Dont use products that combine multiple medicines. This is  because side effects may be increased. Read all medicine labels. You can also ask the pharmacist for help. (People with high blood pressure should not use decongestants. They can raise blood pressure.)    Over-the-counter antihistamines may help if allergies contributed to your sinusitis.      Use acetaminophen or ibuprofen to control pain, unless another pain medicine was prescribed to you. If you have chronic liver or kidney disease or ever had a stomach ulcer, talk with your healthcare provider before using these medicines. (Aspirin should never be taken by anyone under age 18 who is ill with a fever. It may cause severe liver damage.)    Use nasal rinses or irrigation as instructed by your healthcare provider.    Don't smoke. This can make symptoms worse.  Follow-up care  Follow up with your healthcare provider or our staff if you are better in 1 week.  When to seek medical advice  Call your healthcare provider if any of these occur:    Green or yellow fluid draining from your nose or into your throat    Facial pain or headache that gets worse    Stiff neck    Unusual drowsiness or confusion    Swelling of your forehead or eyelids    Vision problems, such as blurred or double vision    Fever of 100.4 F (38 C) or higher, or as directed by your healthcare provider    Seizure    Breathing problems    Symptoms that don't go away in 10 days  Date Last Reviewed: 11/1/2017 2000-2017 [employer]. [address] [place] [address], [place] [address]. All rights reserved. This information is not intended as a substitute for professional medical care. Always follow your healthcare professional's instructions.

## 2021-06-29 NOTE — PROGRESS NOTES
"Progress Notes by Claire Patterson AuD at 8/20/2020  2:20 PM     Author: Claire Patterson AuD Service: -- Author Type: Audiologist    Filed: 8/20/2020  3:05 PM Encounter Date: 8/20/2020 Status: Signed    : Claire Patterson AuD (Audiologist)       Hearing evaluation in conjunction with ENT exam (Dr. Gross)    Summary:  Audiology visit completed. Please see audiogram below or under \"media\" tab for history and results.    Transducer:  Both insert phones and circumaural headphones were used.    Reliability:    Good    Recommendations:  Follow-up with ENT; retest hearing annually (to monitor) or per medical management/patient concern.  Wear hearing protection consistently in noise to preserve residual hearing sensitivity and to minimize the effects of tinnitus. Custom earplugs designed for musicians with various levels of noise filtration were discussed. Mr. Murguia is not an ideal amplification candidate at this time. Common tinnitus triggers and management strategies were discussed at length.      Asher Grider, [facility]  [place] Licensed Audiologist [id number]           "

## 2021-10-17 ENCOUNTER — HEALTH MAINTENANCE LETTER (OUTPATIENT)
Age: 46
End: 2021-10-17

## 2022-10-01 ENCOUNTER — HEALTH MAINTENANCE LETTER (OUTPATIENT)
Age: 47
End: 2022-10-01

## 2023-02-05 ENCOUNTER — HEALTH MAINTENANCE LETTER (OUTPATIENT)
Age: 48
End: 2023-02-05

## 2024-03-09 ENCOUNTER — HEALTH MAINTENANCE LETTER (OUTPATIENT)
Age: 49
End: 2024-03-09